# Patient Record
Sex: FEMALE | Race: OTHER | HISPANIC OR LATINO | Employment: FULL TIME | URBAN - METROPOLITAN AREA
[De-identification: names, ages, dates, MRNs, and addresses within clinical notes are randomized per-mention and may not be internally consistent; named-entity substitution may affect disease eponyms.]

---

## 2017-02-04 ENCOUNTER — HOSPITAL ENCOUNTER (EMERGENCY)
Facility: MEDICAL CENTER | Age: 27
End: 2017-02-04
Attending: EMERGENCY MEDICINE
Payer: COMMERCIAL

## 2017-02-04 ENCOUNTER — APPOINTMENT (OUTPATIENT)
Dept: RADIOLOGY | Facility: MEDICAL CENTER | Age: 27
End: 2017-02-04
Attending: EMERGENCY MEDICINE
Payer: COMMERCIAL

## 2017-02-04 VITALS
WEIGHT: 293 LBS | RESPIRATION RATE: 18 BRPM | HEIGHT: 67 IN | SYSTOLIC BLOOD PRESSURE: 103 MMHG | HEART RATE: 95 BPM | TEMPERATURE: 98 F | BODY MASS INDEX: 45.99 KG/M2 | DIASTOLIC BLOOD PRESSURE: 63 MMHG | OXYGEN SATURATION: 97 %

## 2017-02-04 DIAGNOSIS — R10.9 ABDOMINAL PAIN, UNSPECIFIED LOCATION: ICD-10-CM

## 2017-02-04 DIAGNOSIS — R19.7 DIARRHEA, UNSPECIFIED TYPE: ICD-10-CM

## 2017-02-04 DIAGNOSIS — R11.2 NAUSEA AND VOMITING, INTRACTABILITY OF VOMITING NOT SPECIFIED, UNSPECIFIED VOMITING TYPE: ICD-10-CM

## 2017-02-04 DIAGNOSIS — R10.30 LOWER ABDOMINAL PAIN: ICD-10-CM

## 2017-02-04 LAB
ALBUMIN SERPL BCP-MCNC: 4.7 G/DL (ref 3.2–4.9)
ALBUMIN/GLOB SERPL: 1.3 G/DL
ALP SERPL-CCNC: 107 U/L (ref 30–99)
ALT SERPL-CCNC: 20 U/L (ref 2–50)
ANION GAP SERPL CALC-SCNC: 10 MMOL/L (ref 0–11.9)
APPEARANCE UR: CLEAR
AST SERPL-CCNC: 18 U/L (ref 12–45)
BACTERIA GENITAL QL WET PREP: NORMAL
BASOPHILS # BLD AUTO: 0.4 % (ref 0–1.8)
BASOPHILS # BLD: 0.09 K/UL (ref 0–0.12)
BILIRUB SERPL-MCNC: 0.9 MG/DL (ref 0.1–1.5)
BUN SERPL-MCNC: 18 MG/DL (ref 8–22)
CALCIUM SERPL-MCNC: 9.5 MG/DL (ref 8.5–10.5)
CHLORIDE SERPL-SCNC: 107 MMOL/L (ref 96–112)
CO2 SERPL-SCNC: 18 MMOL/L (ref 20–33)
COLOR UR AUTO: YELLOW
CREAT SERPL-MCNC: 0.66 MG/DL (ref 0.5–1.4)
EOSINOPHIL # BLD AUTO: 0.12 K/UL (ref 0–0.51)
EOSINOPHIL NFR BLD: 0.6 % (ref 0–6.9)
ERYTHROCYTE [DISTWIDTH] IN BLOOD BY AUTOMATED COUNT: 43.8 FL (ref 35.9–50)
GFR SERPL CREATININE-BSD FRML MDRD: >60 ML/MIN/1.73 M 2
GLOBULIN SER CALC-MCNC: 3.7 G/DL (ref 1.9–3.5)
GLUCOSE SERPL-MCNC: 136 MG/DL (ref 65–99)
GLUCOSE UR QL STRIP.AUTO: NEGATIVE MG/DL
HCG UR QL: NEGATIVE
HCT VFR BLD AUTO: 44.3 % (ref 37–47)
HGB BLD-MCNC: 15.1 G/DL (ref 12–16)
IMM GRANULOCYTES # BLD AUTO: 0.1 K/UL (ref 0–0.11)
IMM GRANULOCYTES NFR BLD AUTO: 0.5 % (ref 0–0.9)
KETONES UR QL STRIP.AUTO: ABNORMAL MG/DL
LEUKOCYTE ESTERASE UR QL STRIP.AUTO: NEGATIVE
LIPASE SERPL-CCNC: 14 U/L (ref 11–82)
LYMPHOCYTES # BLD AUTO: 2.06 K/UL (ref 1–4.8)
LYMPHOCYTES NFR BLD: 10.1 % (ref 22–41)
MCH RBC QN AUTO: 29.3 PG (ref 27–33)
MCHC RBC AUTO-ENTMCNC: 34.1 G/DL (ref 33.6–35)
MCV RBC AUTO: 86 FL (ref 81.4–97.8)
MONOCYTES # BLD AUTO: 0.66 K/UL (ref 0–0.85)
MONOCYTES NFR BLD AUTO: 3.2 % (ref 0–13.4)
NEUTROPHILS # BLD AUTO: 17.46 K/UL (ref 2–7.15)
NEUTROPHILS NFR BLD: 85.2 % (ref 44–72)
NITRITE UR QL STRIP.AUTO: NEGATIVE
NRBC # BLD AUTO: 0 K/UL
NRBC BLD AUTO-RTO: 0 /100 WBC
PH UR STRIP.AUTO: 5.5 [PH]
PLATELET # BLD AUTO: 308 K/UL (ref 164–446)
PMV BLD AUTO: 11.1 FL (ref 9–12.9)
POTASSIUM SERPL-SCNC: 4 MMOL/L (ref 3.6–5.5)
PROT SERPL-MCNC: 8.4 G/DL (ref 6–8.2)
PROT UR QL STRIP: ABNORMAL MG/DL
RBC # BLD AUTO: 5.15 M/UL (ref 4.2–5.4)
RBC UR QL AUTO: ABNORMAL
SIGNIFICANT IND 70042: NORMAL
SITE SITE: NORMAL
SODIUM SERPL-SCNC: 135 MMOL/L (ref 135–145)
SOURCE SOURCE: NORMAL
SP GR UR: 1.02
WBC # BLD AUTO: 20.5 K/UL (ref 4.8–10.8)

## 2017-02-04 PROCEDURE — 99284 EMERGENCY DEPT VISIT MOD MDM: CPT

## 2017-02-04 PROCEDURE — 85025 COMPLETE CBC W/AUTO DIFF WBC: CPT

## 2017-02-04 PROCEDURE — 80053 COMPREHEN METABOLIC PANEL: CPT

## 2017-02-04 PROCEDURE — 700105 HCHG RX REV CODE 258: Performed by: EMERGENCY MEDICINE

## 2017-02-04 PROCEDURE — 81002 URINALYSIS NONAUTO W/O SCOPE: CPT

## 2017-02-04 PROCEDURE — 96374 THER/PROPH/DIAG INJ IV PUSH: CPT

## 2017-02-04 PROCEDURE — 36415 COLL VENOUS BLD VENIPUNCTURE: CPT

## 2017-02-04 PROCEDURE — 96375 TX/PRO/DX INJ NEW DRUG ADDON: CPT

## 2017-02-04 PROCEDURE — 81025 URINE PREGNANCY TEST: CPT

## 2017-02-04 PROCEDURE — 87491 CHLMYD TRACH DNA AMP PROBE: CPT

## 2017-02-04 PROCEDURE — 96361 HYDRATE IV INFUSION ADD-ON: CPT

## 2017-02-04 PROCEDURE — 74177 CT ABD & PELVIS W/CONTRAST: CPT

## 2017-02-04 PROCEDURE — 700111 HCHG RX REV CODE 636 W/ 250 OVERRIDE (IP): Performed by: EMERGENCY MEDICINE

## 2017-02-04 PROCEDURE — 87591 N.GONORRHOEAE DNA AMP PROB: CPT

## 2017-02-04 PROCEDURE — 83690 ASSAY OF LIPASE: CPT

## 2017-02-04 RX ORDER — MORPHINE SULFATE 4 MG/ML
4 INJECTION, SOLUTION INTRAMUSCULAR; INTRAVENOUS ONCE
Status: COMPLETED | OUTPATIENT
Start: 2017-02-04 | End: 2017-02-04

## 2017-02-04 RX ORDER — ONDANSETRON 2 MG/ML
4 INJECTION INTRAMUSCULAR; INTRAVENOUS ONCE
Status: COMPLETED | OUTPATIENT
Start: 2017-02-04 | End: 2017-02-04

## 2017-02-04 RX ORDER — ONDANSETRON 4 MG/1
4 TABLET, FILM COATED ORAL EVERY 8 HOURS PRN
Qty: 6 EACH | Refills: 2 | Status: SHIPPED | OUTPATIENT
Start: 2017-02-04 | End: 2019-02-08

## 2017-02-04 RX ORDER — SODIUM CHLORIDE 9 MG/ML
INJECTION, SOLUTION INTRAVENOUS CONTINUOUS
Status: DISCONTINUED | OUTPATIENT
Start: 2017-02-04 | End: 2017-02-04 | Stop reason: HOSPADM

## 2017-02-04 RX ADMIN — SODIUM CHLORIDE: 9 INJECTION, SOLUTION INTRAVENOUS at 09:15

## 2017-02-04 RX ADMIN — MORPHINE SULFATE 4 MG: 4 INJECTION INTRAVENOUS at 09:15

## 2017-02-04 RX ADMIN — ONDANSETRON 4 MG: 2 INJECTION, SOLUTION INTRAMUSCULAR; INTRAVENOUS at 09:15

## 2017-02-04 ASSESSMENT — PAIN SCALES - GENERAL
PAINLEVEL_OUTOF10: 5
PAINLEVEL_OUTOF10: 8
PAINLEVEL_OUTOF10: 5

## 2017-02-04 ASSESSMENT — LIFESTYLE VARIABLES: DO YOU DRINK ALCOHOL: NO

## 2017-02-04 NOTE — ED NOTES
"Chief Complaint   Patient presents with   • Vomiting     started today after eating \"Popeyes\" \" i think i got food poisoning\" \"smells are making me puke\"   • Abdominal Pain     denies pregnancy.        "

## 2017-02-04 NOTE — ED AVS SNAPSHOT
Night Node Software Access Code: [email or web address]  Expires: 3/6/2017  9:12 AM    Night Node Software  A secure, online tool to manage your health information     [employer]’s Night Node Software® is a secure, online tool that connects you to your personalized health information from the privacy of your home -- day or night - making it very easy for you to manage your healthcare. Once the activation process is completed, you can even access your medical information using the Night Node Software leslie, which is available for free in the [employer] Leslie store or Google Play store.     Night Node Software provides the following levels of access (as shown below):   My Chart Features   [facility] Primary Care Doctor [facility]  Specialists [facility]  Urgent  Care Non-[facility]  Primary Care  Doctor   Email your healthcare team securely and privately 24/7 X X X X   Manage appointments: schedule your next appointment; view details of past/upcoming appointments X      Request prescription refills. X      View recent personal medical records, including lab and immunizations X X X X   View health record, including health history, allergies, medications X X X X   Read reports about your outpatient visits, procedures, consult and ER notes X X X X   See your discharge summary, which is a recap of your hospital and/or ER visit that includes your diagnosis, lab results, and care plan. X X       How to register for Night Node Software:  1. Go to  [email or web address].  2. Click on the Sign Up Now box, which takes you to the New Member Sign Up page. You will need to provide the following information:  a. Enter your Night Node Software Access Code exactly as it appears at the top of this page. (You will not need to use this code after you’ve completed the sign-up process. If you do not sign up before the expiration date, you must request a new code.)   b. Enter your date of birth.   c. Enter your home email address.   d. Click Submit, and follow the next screen’s instructions.  3. Create a Night Node Software ID. This will be your Night Node Software  login ID and cannot be changed, so think of one that is secure and easy to remember.  4. Create a [employer] password. You can change your password at any time.  5. Enter your Password Reset Question and Answer. This can be used at a later time if you forget your password.   6. Enter your e-mail address. This allows you to receive e-mail notifications when new information is available in [employer].  7. Click Sign Up. You can now view your health information.    For assistance activating your [employer] account, call [phone]

## 2017-02-04 NOTE — DISCHARGE INSTRUCTIONS
Abdominal Pain, Women  Abdominal (stomach, pelvic, or belly) pain can be caused by many things. It is important to tell your doctor:  · The location of the pain.  · Does it come and go or is it present all the time?  · Are there things that start the pain (eating certain foods, exercise)?  · Are there other symptoms associated with the pain (fever, nausea, vomiting, diarrhea)?  All of this is helpful to know when trying to find the cause of the pain.  CAUSES   · Stomach: virus or bacteria infection, or ulcer.  · Intestine: appendicitis (inflamed appendix), regional ileitis (Crohn's disease), ulcerative colitis (inflamed colon), irritable bowel syndrome, diverticulitis (inflamed diverticulum of the colon), or cancer of the stomach or intestine.  · Gallbladder disease or stones in the gallbladder.  · Kidney disease, kidney stones, or infection.  · Pancreas infection or cancer.  · Fibromyalgia (pain disorder).  · Diseases of the female organs:  · Uterus: fibroid (non-cancerous) tumors or infection.  · Fallopian tubes: infection or tubal pregnancy.  · Ovary: cysts or tumors.  · Pelvic adhesions (scar tissue).  · Endometriosis (uterus lining tissue growing in the pelvis and on the pelvic organs).  · Pelvic congestion syndrome (female organs filling up with blood just before the menstrual period).  · Pain with the menstrual period.  · Pain with ovulation (producing an egg).  · Pain with an IUD (intrauterine device, birth control) in the uterus.  · Cancer of the female organs.  · Functional pain (pain not caused by a disease, may improve without treatment).  · Psychological pain.  · Depression.  DIAGNOSIS   Your doctor will decide the seriousness of your pain by doing an examination.  · Blood tests.  · X-rays.  · Ultrasound.  · CT scan (computed tomography, special type of X-ray).  · MRI (magnetic resonance imaging).  · Cultures, for infection.  · Barium enema (dye inserted in the large intestine, to better view it with  X-rays).  · Colonoscopy (looking in intestine with a lighted tube).  · Laparoscopy (minor surgery, looking in abdomen with a lighted tube).  · Major abdominal exploratory surgery (looking in abdomen with a large incision).  TREATMENT   The treatment will depend on the cause of the pain.   · Many cases can be observed and treated at home.  · Over-the-counter medicines recommended by your caregiver.  · Prescription medicine.  · Antibiotics, for infection.  · Birth control pills, for painful periods or for ovulation pain.  · Hormone treatment, for endometriosis.  · Nerve blocking injections.  · Physical therapy.  · Antidepressants.  · Counseling with a psychologist or psychiatrist.  · Minor or major surgery.  HOME CARE INSTRUCTIONS   · Do not take laxatives, unless directed by your caregiver.  · Take over-the-counter pain medicine only if ordered by your caregiver. Do not take aspirin because it can cause an upset stomach or bleeding.  · Try a clear liquid diet (broth or water) as ordered by your caregiver. Slowly move to a bland diet, as tolerated, if the pain is related to the stomach or intestine.  · Have a thermometer and take your temperature several times a day, and record it.  · Bed rest and sleep, if it helps the pain.  · Avoid sexual intercourse, if it causes pain.  · Avoid stressful situations.  · Keep your follow-up appointments and tests, as your caregiver orders.  · If the pain does not go away with medicine or surgery, you may try:  · Acupuncture.  · Relaxation exercises (yoga, meditation).  · Group therapy.  · Counseling.  SEEK MEDICAL CARE IF:   · You notice certain foods cause stomach pain.  · Your home care treatment is not helping your pain.  · You need stronger pain medicine.  · You want your IUD removed.  · You feel faint or lightheaded.  · You develop nausea and vomiting.  · You develop a rash.  · You are having side effects or an allergy to your medicine.  SEEK IMMEDIATE MEDICAL CARE IF:   · Your  pain does not go away or gets worse.  · You have a fever.  · Your pain is felt only in portions of the abdomen. The right side could possibly be appendicitis. The left lower portion of the abdomen could be colitis or diverticulitis.  · You are passing blood in your stools (bright red or black tarry stools, with or without vomiting).  · You have blood in your urine.  · You develop chills, with or without a fever.  · You pass out.  MAKE SURE YOU:   · Understand these instructions.  · Will watch your condition.  · Will get help right away if you are not doing well or get worse.  Document Released: 10/14/2008 Document Revised: 03/11/2013 Document Reviewed: 11/04/2010  [employer]Care® Patient Information ©2014 [employer].    Diarrhea  Diarrhea is watery poop (stool). It can make you feel weak, tired, thirsty, or give you a dry mouth (signs of dehydration). Watery poop is a sign of another problem, most often an infection. It often lasts 2-3 days. It can last longer if it is a sign of something serious. Take care of yourself as told by your doctor.  HOME CARE   · Drink 1 cup (8 ounces) of fluid each time you have watery poop.  · Do not drink the following fluids:  ¨ Those that contain simple sugars (fructose, glucose, galactose, lactose, sucrose, maltose).  ¨ Sports drinks.  ¨ Fruit juices.  ¨ Whole milk products.  ¨ Sodas.  ¨ Drinks with caffeine (coffee, tea, soda) or alcohol.  · Oral rehydration solution may be used if the doctor says it is okay. You may make your own solution. Follow this recipe:  ¨  - teaspoon table salt.  ¨ ¾ teaspoon baking soda.  ¨  teaspoon salt substitute containing potassium chloride.  ¨ 1 tablespoons sugar.  ¨ 1 liter (34 ounces) of water.  · Avoid the following foods:  ¨ High fiber foods, such as raw fruits and vegetables.  ¨ Nuts, seeds, and whole grain breads and cereals.  ¨  Those that are sweetened with sugar alcohols (xylitol, sorbitol, mannitol).  · Try eating the following  foods:  ¨ Starchy foods, such as rice, toast, pasta, low-sugar cereal, oatmeal, baked potatoes, crackers, and bagels.  ¨ Bananas.  ¨ Applesauce.  · Eat probiotic-rich foods, such as yogurt and milk products that are fermented.  · Wash your hands well after each time you have watery poop.  · Only take medicine as told by your doctor.  · Take a warm bath to help lessen burning or pain from having watery poop.  GET HELP RIGHT AWAY IF:   · You cannot drink fluids without throwing up (vomiting).  · You keep throwing up.  · You have blood in your poop, or your poop looks black and tarry.  · You do not pee (urinate) in 6-8 hours, or there is only a small amount of very dark pee.  · You have belly (abdominal) pain that gets worse or stays in the same spot (localizes).  · You are weak, dizzy, confused, or light-headed.  · You have a very bad headache.  · Your watery poop gets worse or does not get better.  · You have a fever or lasting symptoms for more than 2-3 days.  · You have a fever and your symptoms suddenly get worse.  MAKE SURE YOU:   · Understand these instructions.  · Will watch your condition.  · Will get help right away if you are not doing well or get worse.     This information is not intended to replace advice given to you by your health care provider. Make sure you discuss any questions you have with your health care provider.     Document Released: 06/05/2009 Document Revised: 01/08/2016 Document Reviewed: 08/25/2013  [employer] Patient Education ©2016 [employer]  Return if fever, vomiting or if no better in 12 hours.Return if fever, vomiting or if no better in 12 hours.

## 2017-02-04 NOTE — ED PROVIDER NOTES
"ED Provider Note    CHIEF COMPLAINT  Chief Complaint   Patient presents with   • Vomiting     started today after eating \"Popeyes\" \" i think i got food poisoning\" \"smells are making me puke\"   • Abdominal Pain     denies pregnancy.        HPI  Flaquita Mittal is a 26 y.o. female who presents with nausea vomiting, 2 AM, last vomiting here. Diarrhea 2 AM, last diarrhea here. No GI blood. Also complains of lower abdominal pain since around 6 AM this morning, moderate pain, gradual nonradiating,. Last period was 2 days ago, brief.. This is unusual for her Obstetric history is 0000. No fever no chills no dysuria urgency or frequency.     REVIEW OF SYSTEMS  See HPI for further details.Denies other G.I., G.U.. endrocine, cardiovascular, respriatory or neurological problems.  All other systems are negative.     PAST MEDICAL HISTORY  History reviewed. No pertinent past medical history.    FAMILY HISTORY  No family history on file.    SOCIAL HISTORY  Social History     Social History   • Marital Status: Single     Spouse Name: N/A   • Number of Children: N/A   • Years of Education: N/A     Social History Main Topics   • Smoking status: Current Every Day Smoker -- 0.10 packs/day     Types: Cigarettes   • Smokeless tobacco: None   • Alcohol Use: No   • Drug Use: No   • Sexual Activity: Not Asked     Other Topics Concern   • None     Social History Narrative       SURGICAL HISTORY  Past Surgical History   Procedure Laterality Date   • Other abdominal surgery       simone       CURRENT MEDICATIONS  Home Medications     Reviewed by Ty Blanton, STUDENT (Student Nurse) on 02/04/17 at 0721  Med List Status: Not Addressed    Medication Last Dose Status    promethazine (PHENERGAN) 25 MG Tab  Active                ALLERGIES  No Known Allergies    PHYSICAL EXAM  VITAL SIGNS: /71 mmHg  Pulse 100  Temp(Src) 36.8 °C (98.2 °F) (Temporal)  Resp 16  Ht 1.702 m (5' 7\")  Wt 133.9 kg (295 lb 3.1 oz)  BMI 46.22 kg/m2  SpO2 " 98%  LMP 01/03/2017 (Exact Date)  Constitutional: Well developed, Well nourished, moderately obese No acute distress, Non-toxic appearance.   HENT: Normocephalic, Atraumatic, Bilateral external ears normal, Oropharynx moist, No oral exudates, Nose normal.   Eyes: PERRL, EOMI, Conjunctiva normal, No discharge.   Neck: Normal range of motion, No tenderness, Supple, No stridor.   Lymphatic: No lymphadenopathy noted.   Cardiovascular: Normal heart rate, Normal rhythm, No murmurs, No rubs, No gallops.   Thorax & Lungs: Normal breath sounds, No respiratory distress, No wheezing, No chest tenderness.   Abdomen: Tender suprapubic area, no guarding no rigidity and the abdomen is soft.  No masses, No pulsatile masses.  Skin: Warm, Dry, No erythema, No rash.   Back: No tenderness, No CVA tenderness.   Extremities: Intact distal pulses, No edema, No tenderness, No cyanosis, No clubbing.   Musculoskeletal: Good range of motion in all major joints. No tenderness to palpation or major deformities noted.   Neurologic: Alert & oriented x 3, Normal motor function, Normal sensory function, No focal deficits noted.   Psychiatric: Affect normal, Judgment normal, Mood normal.   Pelvic exam, no masses no discharge no uterine enlargement and no tenderness no bleeding    RADIOLOGY/PROCEDURES  CT-ABDOMEN-PELVIS WITH   Final Result      1.  Prior cholecystectomy.  No gross biliary dilation.   2.  Normal appendix.   3.  5.8 cm LEFT ovarian cyst.          COURSE & MEDICAL DECISION MAKING  Pertinent Labs & Imaging studies reviewed. (See chart for details) electrolytes, unremarkable renal function and normal liver function normal white count is elevated 20,000. There is no shift 85 polys.  Urinalysis, normal, pregnancy test negative.      She has had vomiting, diarrhea, now lower abdominal pain. Tenderness suprapubic area. is given IV normal saline and morphine and Zofran.  She has been washed here in the emergency room for several hours. By  noon, her abdominal pain is gone. She no longer has any tenderness. CAT scan of the abdomen demonstrates no acute abnormalities. There is however a 5.8 cm left ovarian cyst. Currently she has no more vomiting or diarrhea. White count was noted to be 20,000, concerning however remainder of the workup has been negative. No evidence of urinary tract infection or acute abdominal process. This patient understands that abdominal pain may be very elusive. At this point there is no evidence of an acute abdominal emergency. However if the pain returns or is no better in 12 hours or any vomiting they should return to the emergency room immediately. Just because the lab and x-rays are normal does not rule out a severe abdominal emergency  We'll be discharging her, clear fluids, Zofran,  FINAL IMPRESSION  1.   1. Nausea and vomiting, intractability of vomiting not specified, unspecified vomiting type    2. Diarrhea, unspecified type    3. Abdominal pain, unspecified location    4. Lower abdominal pain        2.   3.     Disposition  Discharge instructions are understood. This patient is to return if fever vomiting or no better in 12 hours. Follow up with the [facility] clinic or private physician. Information sheets on vomiting diarrhea abdominal pain      Electronically signed by: Tam Washington, 2/4/2017 8:40 AM

## 2017-02-04 NOTE — ED AVS SNAPSHOT
After Visit Summary                                                                                                                Flaquita Mittal   MRN: [id number]    Department:  [facility], Emergency Dept   Date of Visit:  2/4/2017            [facility], Emergency Dept    [address]    [place] [address]    Phone:  [phone]      You were seen by     Tam Washington M.D.      Your Diagnosis Was     Nausea and vomiting, intractability of vomiting not specified, unspecified vomiting type     R11.2       These are the medications you received during your hospitalization from 02/04/2017 0614 to 02/04/2017 1210     Date/Time Order Dose Route Action    02/04/2017 0915 NS infusion   Intravenous New Bag    02/04/2017 0915 morphine (pf) 4 mg/ml injection 4 mg 4 mg Intravenous Given    02/04/2017 0915 ondansetron (ZOFRAN) syringe/vial injection 4 mg 4 mg Intravenous Given      Follow-up Information     1. Follow up with [facility]. Schedule an appointment as soon as possible for a visit today.    Contact information    [facility]  [place] [address]  [phone]        Medication Information     Review all of your home medications and newly ordered medications with your primary doctor and/or pharmacist as soon as possible. Follow medication instructions as directed by your doctor and/or pharmacist.     Please keep your complete medication list with you and share with your physician. Update the information when medications are discontinued, doses are changed, or new medications (including over-the-counter products) are added; and carry medication information at all times in the event of emergency situations.               Medication List      START taking these medications        Instructions    * ondansetron 4 MG Tabs tablet   Commonly known as:  ZOFRAN    Take 1 Tab by mouth every 8 hours as needed (FOR NAUSEA OR VIMITING) for up to 6 doses.   Dose:  4 mg       *  ondansetron 4 MG Tabs tablet   Commonly known as:  ZOFRAN    Take 1 Tab by mouth every 8 hours as needed (FOR NAUSEA OR VIMITING) for up to 6 doses.   Dose:  4 mg       * Notice:  This list has 2 medication(s) that are the same as other medications prescribed for you. Read the directions carefully, and ask your doctor or other care provider to review them with you.      ASK your doctor about these medications        Instructions    promethazine 25 MG Tabs   Commonly known as:  PHENERGAN    Take 1 Tab by mouth every 6 hours as needed for Nausea/Vomiting.   Dose:  25 mg               Procedures and tests performed during your visit     Procedure/Test Number of Times Performed    CBC WITH DIFFERENTIAL 1    CHLAMYDIA & GC BY PCR 1    COMP METABOLIC PANEL 1    CONSENT FOR CONTRAST INJECTION 2    CT-ABDOMEN-PELVIS WITH 1    ESTIMATED GFR 1    LIPASE 1    O2 Protocol 1    POC UA 1    POC URINE PREGNANCY 1    SALINE LOCK 1    Set Up for Pelvic Exam 1    WET PREP 1        Discharge Instructions       Abdominal Pain, Women  Abdominal (stomach, pelvic, or belly) pain can be caused by many things. It is important to tell your doctor:  · The location of the pain.  · Does it come and go or is it present all the time?  · Are there things that start the pain (eating certain foods, exercise)?  · Are there other symptoms associated with the pain (fever, nausea, vomiting, diarrhea)?  All of this is helpful to know when trying to find the cause of the pain.  CAUSES   · Stomach: virus or bacteria infection, or ulcer.  · Intestine: appendicitis (inflamed appendix), regional ileitis (Crohn's disease), ulcerative colitis (inflamed colon), irritable bowel syndrome, diverticulitis (inflamed diverticulum of the colon), or cancer of the stomach or intestine.  · Gallbladder disease or stones in the gallbladder.  · Kidney disease, kidney stones, or infection.  · Pancreas infection or cancer.  · Fibromyalgia (pain disorder).  · Diseases of the  female organs:  · Uterus: fibroid (non-cancerous) tumors or infection.  · Fallopian tubes: infection or tubal pregnancy.  · Ovary: cysts or tumors.  · Pelvic adhesions (scar tissue).  · Endometriosis (uterus lining tissue growing in the pelvis and on the pelvic organs).  · Pelvic congestion syndrome (female organs filling up with blood just before the menstrual period).  · Pain with the menstrual period.  · Pain with ovulation (producing an egg).  · Pain with an IUD (intrauterine device, birth control) in the uterus.  · Cancer of the female organs.  · Functional pain (pain not caused by a disease, may improve without treatment).  · Psychological pain.  · Depression.  DIAGNOSIS   Your doctor will decide the seriousness of your pain by doing an examination.  · Blood tests.  · X-rays.  · Ultrasound.  · CT scan (computed tomography, special type of X-ray).  · MRI (magnetic resonance imaging).  · Cultures, for infection.  · Barium enema (dye inserted in the large intestine, to better view it with X-rays).  · Colonoscopy (looking in intestine with a lighted tube).  · Laparoscopy (minor surgery, looking in abdomen with a lighted tube).  · Major abdominal exploratory surgery (looking in abdomen with a large incision).  TREATMENT   The treatment will depend on the cause of the pain.   · Many cases can be observed and treated at home.  · Over-the-counter medicines recommended by your caregiver.  · Prescription medicine.  · Antibiotics, for infection.  · Birth control pills, for painful periods or for ovulation pain.  · Hormone treatment, for endometriosis.  · Nerve blocking injections.  · Physical therapy.  · Antidepressants.  · Counseling with a psychologist or psychiatrist.  · Minor or major surgery.  HOME CARE INSTRUCTIONS   · Do not take laxatives, unless directed by your caregiver.  · Take over-the-counter pain medicine only if ordered by your caregiver. Do not take aspirin because it can cause an upset stomach or  bleeding.  · Try a clear liquid diet (broth or water) as ordered by your caregiver. Slowly move to a bland diet, as tolerated, if the pain is related to the stomach or intestine.  · Have a thermometer and take your temperature several times a day, and record it.  · Bed rest and sleep, if it helps the pain.  · Avoid sexual intercourse, if it causes pain.  · Avoid stressful situations.  · Keep your follow-up appointments and tests, as your caregiver orders.  · If the pain does not go away with medicine or surgery, you may try:  · Acupuncture.  · Relaxation exercises (yoga, meditation).  · Group therapy.  · Counseling.  SEEK MEDICAL CARE IF:   · You notice certain foods cause stomach pain.  · Your home care treatment is not helping your pain.  · You need stronger pain medicine.  · You want your IUD removed.  · You feel faint or lightheaded.  · You develop nausea and vomiting.  · You develop a rash.  · You are having side effects or an allergy to your medicine.  SEEK IMMEDIATE MEDICAL CARE IF:   · Your pain does not go away or gets worse.  · You have a fever.  · Your pain is felt only in portions of the abdomen. The right side could possibly be appendicitis. The left lower portion of the abdomen could be colitis or diverticulitis.  · You are passing blood in your stools (bright red or black tarry stools, with or without vomiting).  · You have blood in your urine.  · You develop chills, with or without a fever.  · You pass out.  MAKE SURE YOU:   · Understand these instructions.  · Will watch your condition.  · Will get help right away if you are not doing well or get worse.  Document Released: 10/14/2008 Document Revised: 03/11/2013 Document Reviewed: 11/04/2010  ExitCare® Patient Information ©2014 [employer].    Diarrhea  Diarrhea is watery poop (stool). It can make you feel weak, tired, thirsty, or give you a dry mouth (signs of dehydration). Watery poop is a sign of another problem, most often an infection. It  often lasts 2-3 days. It can last longer if it is a sign of something serious. Take care of yourself as told by your doctor.  HOME CARE   · Drink 1 cup (8 ounces) of fluid each time you have watery poop.  · Do not drink the following fluids:  ¨ Those that contain simple sugars (fructose, glucose, galactose, lactose, sucrose, maltose).  ¨ Sports drinks.  ¨ Fruit juices.  ¨ Whole milk products.  ¨ Sodas.  ¨ Drinks with caffeine (coffee, tea, soda) or alcohol.  · Oral rehydration solution may be used if the doctor says it is okay. You may make your own solution. Follow this recipe:  ¨  - teaspoon table salt.  ¨ ¾ teaspoon baking soda.  ¨  teaspoon salt substitute containing potassium chloride.  ¨ 1 tablespoons sugar.  ¨ 1 liter (34 ounces) of water.  · Avoid the following foods:  ¨ High fiber foods, such as raw fruits and vegetables.  ¨ Nuts, seeds, and whole grain breads and cereals.  ¨  Those that are sweetened with sugar alcohols (xylitol, sorbitol, mannitol).  · Try eating the following foods:  ¨ Starchy foods, such as rice, toast, pasta, low-sugar cereal, oatmeal, baked potatoes, crackers, and bagels.  ¨ Bananas.  ¨ Applesauce.  · Eat probiotic-rich foods, such as yogurt and milk products that are fermented.  · Wash your hands well after each time you have watery poop.  · Only take medicine as told by your doctor.  · Take a warm bath to help lessen burning or pain from having watery poop.  GET HELP RIGHT AWAY IF:   · You cannot drink fluids without throwing up (vomiting).  · You keep throwing up.  · You have blood in your poop, or your poop looks black and tarry.  · You do not pee (urinate) in 6-8 hours, or there is only a small amount of very dark pee.  · You have belly (abdominal) pain that gets worse or stays in the same spot (localizes).  · You are weak, dizzy, confused, or light-headed.  · You have a very bad headache.  · Your watery poop gets worse or does not get better.  · You have a fever or lasting  symptoms for more than 2-3 days.  · You have a fever and your symptoms suddenly get worse.  MAKE SURE YOU:   · Understand these instructions.  · Will watch your condition.  · Will get help right away if you are not doing well or get worse.     This information is not intended to replace advice given to you by your health care provider. Make sure you discuss any questions you have with your health care provider.     Document Released: 06/05/2009 Document Revised: 01/08/2016 Document Reviewed: 08/25/2013  [employer] Interactive Patient Education ©2016 [employer].  Return if fever, vomiting or if no better in 12 hours.Return if fever, vomiting or if no better in 12 hours.          Patient Information     Patient Information    Following emergency treatment: all patient requiring follow-up care must return either to a private physician or a clinic if your condition worsens before you are able to obtain further medical attention, please return to the emergency room.     Billing Information    At [facility], we work to make the billing process streamlined for our patients.  Our Representatives are here to answer any questions you may have regarding your hospital bill.  If you have insurance coverage and have supplied your insurance information to us, we will submit a claim to your insurer on your behalf.  Should you have any questions regarding your bill, we can be reached online or by phone as follows:  Online: You are able pay your bills online or live chat with our representatives about any billing questions you may have. We are here to help Monday - Friday from 8:00am to 7:30pm and 9:00am - 12:00pm on Saturdays.  Please visit [email or web address]  for more information.   Phone:  [phone] or [phone]    Please note that your emergency physician, surgeon, pathologist, radiologist, anesthesiologist, and other specialists are not employed by [facility] and will therefore bill  separately for their services.  Please contact them directly for any questions concerning their bills at the numbers below:     Emergency Physician Services:  [phone]  [facility]:  [phone]  [facility]:  [phone]  [facility]:  [phone]    1. Your final bill may vary from the amount quoted upon discharge if all procedures are not complete at that time, or if your doctor has additional procedures of which we are not aware. You will receive an additional bill if you return to the Emergency Department at [facility] for suture removal regardless of the facility of which the sutures were placed.     2. Please arrange for settlement of this account at the emergency registration.    3. All self-pay accounts are due in full at the time of treatment.  If you are unable to meet this obligation then payment is expected within 4-5 days.     4. If you have had radiology studies (CT, X-ray, Ultrasound, MRI), you have received a preliminary result during your emergency department visit. Please contact the radiology department [phone] to receive a copy of your final result. Please discuss the Final result with your primary physician or with the follow up physician provided.     Crisis Hotline:  [place] Crisis Hotline:  4-057-JBXSMYM or 1-409.295.2493  [place] Crisis Hotline:    1-116.808.8957 or 042-144-8422         ED Discharge Follow Up Questions    1. In order to provide you with very good care, we would like to follow up with a phone call in the next few days.  May we have your permission to contact you?     YES /  NO    2. What is the best phone number to call you? (       )_____-__________    3. What is the best time to call you?      Morning  /  Afternoon  /  Evening                   Patient Signature:  ____________________________________________________________    Date:  ____________________________________________________________

## 2017-02-04 NOTE — ED NOTES
Pelvic exam performed by ERP with female RN at bedside- pt tolerated procedure well- pt medicated as ordered

## 2017-02-04 NOTE — ED AVS SNAPSHOT
2/4/2017          Flaquita Mittal  [address] [place] [address]    Dear Flaquita:    [facility] wants to ensure your discharge home is safe and you or your loved ones have had all your questions answered regarding your care after you leave the hospital.    You may receive a telephone call within two days of your discharge.  This call is to make certain you understand your discharge instructions as well as ensure we provided you with the best care possible during your stay with us.     The call will only last approximately 3-5 minutes and will be done by a nurse.    Once again, we want to ensure your discharge home is safe and that you have a clear understanding of any next steps in your care.  If you have any questions or concerns, please do not hesitate to contact us, we are here for you.  Thank you for choosing [facility] for your healthcare needs.    Sincerely,    Shahbaz James    [facility]

## 2017-02-06 LAB
C TRACH DNA SPEC QL NAA+PROBE: NEGATIVE
N GONORRHOEA DNA SPEC QL NAA+PROBE: NEGATIVE
SPECIMEN SOURCE: NORMAL

## 2017-08-04 PROCEDURE — 99284 EMERGENCY DEPT VISIT MOD MDM: CPT

## 2017-08-04 ASSESSMENT — PAIN SCALES - GENERAL: PAINLEVEL_OUTOF10: 8

## 2017-08-05 ENCOUNTER — APPOINTMENT (OUTPATIENT)
Dept: RADIOLOGY | Facility: MEDICAL CENTER | Age: 27
End: 2017-08-05
Attending: EMERGENCY MEDICINE
Payer: COMMERCIAL

## 2017-08-05 ENCOUNTER — HOSPITAL ENCOUNTER (EMERGENCY)
Facility: MEDICAL CENTER | Age: 27
End: 2017-08-05
Attending: EMERGENCY MEDICINE
Payer: COMMERCIAL

## 2017-08-05 VITALS
OXYGEN SATURATION: 95 % | HEIGHT: 67 IN | SYSTOLIC BLOOD PRESSURE: 110 MMHG | DIASTOLIC BLOOD PRESSURE: 71 MMHG | RESPIRATION RATE: 17 BRPM | HEART RATE: 85 BPM | BODY MASS INDEX: 45.99 KG/M2 | WEIGHT: 293 LBS | TEMPERATURE: 98.1 F

## 2017-08-05 DIAGNOSIS — R10.31 RIGHT LOWER QUADRANT ABDOMINAL PAIN: ICD-10-CM

## 2017-08-05 DIAGNOSIS — N83.202 CYST OF LEFT OVARY: ICD-10-CM

## 2017-08-05 LAB
ALBUMIN SERPL BCP-MCNC: 3.8 G/DL (ref 3.2–4.9)
ALBUMIN/GLOB SERPL: 1.1 G/DL
ALP SERPL-CCNC: 99 U/L (ref 30–99)
ALT SERPL-CCNC: 19 U/L (ref 2–50)
ANION GAP SERPL CALC-SCNC: 8 MMOL/L (ref 0–11.9)
AST SERPL-CCNC: 15 U/L (ref 12–45)
BASOPHILS # BLD AUTO: 0.4 % (ref 0–1.8)
BASOPHILS # BLD: 0.05 K/UL (ref 0–0.12)
BILIRUB SERPL-MCNC: 0.5 MG/DL (ref 0.1–1.5)
BUN SERPL-MCNC: 13 MG/DL (ref 8–22)
CALCIUM SERPL-MCNC: 9.4 MG/DL (ref 8.5–10.5)
CHLORIDE SERPL-SCNC: 107 MMOL/L (ref 96–112)
CO2 SERPL-SCNC: 21 MMOL/L (ref 20–33)
CREAT SERPL-MCNC: 0.66 MG/DL (ref 0.5–1.4)
EOSINOPHIL # BLD AUTO: 0.29 K/UL (ref 0–0.51)
EOSINOPHIL NFR BLD: 2.3 % (ref 0–6.9)
ERYTHROCYTE [DISTWIDTH] IN BLOOD BY AUTOMATED COUNT: 44.2 FL (ref 35.9–50)
GFR SERPL CREATININE-BSD FRML MDRD: >60 ML/MIN/1.73 M 2
GLOBULIN SER CALC-MCNC: 3.6 G/DL (ref 1.9–3.5)
GLUCOSE SERPL-MCNC: 92 MG/DL (ref 65–99)
HCG SERPL QL: NEGATIVE
HCT VFR BLD AUTO: 42.2 % (ref 37–47)
HGB BLD-MCNC: 14 G/DL (ref 12–16)
IMM GRANULOCYTES # BLD AUTO: 0.06 K/UL (ref 0–0.11)
IMM GRANULOCYTES NFR BLD AUTO: 0.5 % (ref 0–0.9)
LIPASE SERPL-CCNC: 17 U/L (ref 11–82)
LYMPHOCYTES # BLD AUTO: 4.54 K/UL (ref 1–4.8)
LYMPHOCYTES NFR BLD: 35.7 % (ref 22–41)
MCH RBC QN AUTO: 28.3 PG (ref 27–33)
MCHC RBC AUTO-ENTMCNC: 33.2 G/DL (ref 33.6–35)
MCV RBC AUTO: 85.4 FL (ref 81.4–97.8)
MONOCYTES # BLD AUTO: 0.98 K/UL (ref 0–0.85)
MONOCYTES NFR BLD AUTO: 7.7 % (ref 0–13.4)
NEUTROPHILS # BLD AUTO: 6.78 K/UL (ref 2–7.15)
NEUTROPHILS NFR BLD: 53.4 % (ref 44–72)
NRBC # BLD AUTO: 0 K/UL
NRBC BLD AUTO-RTO: 0 /100 WBC
PLATELET # BLD AUTO: 313 K/UL (ref 164–446)
PMV BLD AUTO: 10.8 FL (ref 9–12.9)
POTASSIUM SERPL-SCNC: 3.8 MMOL/L (ref 3.6–5.5)
PROT SERPL-MCNC: 7.4 G/DL (ref 6–8.2)
RBC # BLD AUTO: 4.94 M/UL (ref 4.2–5.4)
SODIUM SERPL-SCNC: 136 MMOL/L (ref 135–145)
WBC # BLD AUTO: 12.7 K/UL (ref 4.8–10.8)

## 2017-08-05 PROCEDURE — 74177 CT ABD & PELVIS W/CONTRAST: CPT

## 2017-08-05 PROCEDURE — 96374 THER/PROPH/DIAG INJ IV PUSH: CPT

## 2017-08-05 PROCEDURE — 80053 COMPREHEN METABOLIC PANEL: CPT

## 2017-08-05 PROCEDURE — 700117 HCHG RX CONTRAST REV CODE 255: Performed by: EMERGENCY MEDICINE

## 2017-08-05 PROCEDURE — 700111 HCHG RX REV CODE 636 W/ 250 OVERRIDE (IP): Performed by: EMERGENCY MEDICINE

## 2017-08-05 PROCEDURE — 76830 TRANSVAGINAL US NON-OB: CPT

## 2017-08-05 PROCEDURE — 83690 ASSAY OF LIPASE: CPT

## 2017-08-05 PROCEDURE — 36415 COLL VENOUS BLD VENIPUNCTURE: CPT

## 2017-08-05 PROCEDURE — 85025 COMPLETE CBC W/AUTO DIFF WBC: CPT

## 2017-08-05 PROCEDURE — 84703 CHORIONIC GONADOTROPIN ASSAY: CPT

## 2017-08-05 RX ORDER — KETOROLAC TROMETHAMINE 30 MG/ML
30 INJECTION, SOLUTION INTRAMUSCULAR; INTRAVENOUS ONCE
Status: COMPLETED | OUTPATIENT
Start: 2017-08-05 | End: 2017-08-05

## 2017-08-05 RX ORDER — NITROFURANTOIN MACROCRYSTALS 100 MG/1
100 CAPSULE ORAL EVERY 6 HOURS
Qty: 28 CAP | Refills: 0 | Status: SHIPPED | OUTPATIENT
Start: 2017-08-05 | End: 2017-08-05

## 2017-08-05 RX ORDER — PHENAZOPYRIDINE HYDROCHLORIDE 200 MG/1
200 TABLET, FILM COATED ORAL 3 TIMES DAILY PRN
Qty: 6 TAB | Refills: 0 | Status: SHIPPED | OUTPATIENT
Start: 2017-08-05 | End: 2017-08-05

## 2017-08-05 RX ADMIN — KETOROLAC TROMETHAMINE 30 MG: 30 INJECTION, SOLUTION INTRAMUSCULAR at 03:33

## 2017-08-05 RX ADMIN — IOHEXOL 100 ML: 350 INJECTION, SOLUTION INTRAVENOUS at 07:42

## 2017-08-05 ASSESSMENT — ENCOUNTER SYMPTOMS
SHORTNESS OF BREATH: 0
HEADACHES: 0
ABDOMINAL PAIN: 1
FEVER: 0
NAUSEA: 1
BACK PAIN: 0

## 2017-08-05 ASSESSMENT — LIFESTYLE VARIABLES: DO YOU DRINK ALCOHOL: NO

## 2017-08-05 ASSESSMENT — PAIN SCALES - GENERAL: PAINLEVEL_OUTOF10: 0

## 2017-08-05 NOTE — ED AVS SNAPSHOT
SeeWhy Access Code: Activation code not generated  Current SeeWhy Status: Patient Declined    [employer]  A secure, online tool to manage your health information     [employer]’s SeeWhy® is a secure, online tool that connects you to your personalized health information from the privacy of your home -- day or night - making it very easy for you to manage your healthcare. Once the activation process is completed, you can even access your medical information using the SeeWhy leslie, which is available for free in the Apple Leslie store or Google Play store.     SeeWhy provides the following levels of access (as shown below):   My Chart Features   [facility] Primary Care Doctor [facility]  Specialists [facility]  Urgent  Care Non-[facility]  Primary Care  Doctor   Email your healthcare team securely and privately 24/7 X X X X   Manage appointments: schedule your next appointment; view details of past/upcoming appointments X      Request prescription refills. X      View recent personal medical records, including lab and immunizations X X X X   View health record, including health history, allergies, medications X X X X   Read reports about your outpatient visits, procedures, consult and ER notes X X X X   See your discharge summary, which is a recap of your hospital and/or ER visit that includes your diagnosis, lab results, and care plan. X X       How to register for SeeWhy:  1. Go to  [email or web address].  2. Click on the Sign Up Now box, which takes you to the New Member Sign Up page. You will need to provide the following information:  a. Enter your SeeWhy Access Code exactly as it appears at the top of this page. (You will not need to use this code after you’ve completed the sign-up process. If you do not sign up before the expiration date, you must request a new code.)   b. Enter your date of birth.   c. Enter your home email address.   d. Click Submit, and follow the next screen’s instructions.  3. Create a SeeWhy ID.  This will be your [facility] login ID and cannot be changed, so think of one that is secure and easy to remember.  4. Create a [employer] password. You can change your password at any time.  5. Enter your Password Reset Question and Answer. This can be used at a later time if you forget your password.   6. Enter your e-mail address. This allows you to receive e-mail notifications when new information is available in [facility] [facility].  7. Click Sign Up. You can now view your health information.    For assistance activating your [employer] account, call [phone]

## 2017-08-05 NOTE — ED AVS SNAPSHOT
8/5/2017    Flaquita Mittal  [address] [place] [address]    Dear Flaquita:    [facility] wants to ensure your discharge home is safe and you or your loved ones have had all of your questions answered regarding your care after you leave the hospital.    Below is a list of resources and contact information should you have any questions regarding your hospital stay, follow-up instructions, or active medical symptoms.    Questions or Concerns Regarding… Contact   Medical Questions Related to Your Discharge  (7 days a week, 8am-5pm) Contact a Nurse Care Coordinator   [phone]   Medical Questions Not Related to Your Discharge  (24 hours a day / 7 days a week)  Contact the Nurse Health Line   [phone]    Medications or Discharge Instructions Refer to your discharge packet   or contact your [facility] Primary Care Provider   [phone]   Follow-up Appointment(s) Schedule your appointment via [employer]   or contact Scheduling [phone]   Billing Review your statement via [employer]  or contact Billing [phone]   Medical Records Review your records via [employer]   or contact Medical Records [phone]     You may receive a telephone call within two days of discharge. This call is to make certain you understand your discharge instructions and have the opportunity to have any questions answered. You can also easily access your medical information, test results and upcoming appointments via the [employer] free online health management tool. You can learn more and sign up at [employer]. For assistance setting up your [employer] account, please call [phone].    Once again, we want to ensure your discharge home is safe and that you have a clear understanding of any next steps in your care. If you have any questions or concerns, please do not hesitate to contact us, we are here for you. Thank you for choosing [facility] for your healthcare needs.    Sincerely,    Your [facility] Healthcare Team

## 2017-08-05 NOTE — ED PROVIDER NOTES
ED Provider Note    Scribed for Greg Brandon M.D. by Antonia Bernal. 8/5/2017, 3:04 AM.    Primary care provider: Pcp Pt States None  Means of arrival: Walk-in  History obtained from: Patient   History limited by: None    CHIEF COMPLAINT  Chief Complaint   Patient presents with   • Ovarian Cyst     8 cm ovarian cyst.  Not having periods for over three months. Occasional r sided cramping, sharp.         HPI  Flaquita Mittal is a 27 y.o. female who presents to the Emergency Department for an 8cm cyst on her left side. Patient was referred by all of the OB/GYN who all referred her to the ED for immediate operation of her cyst secondary to the size of the cyst. She is currently having intermittent sharp abdominal cramping on her right side that suddenly worsened last week and nausea associated. She denies any shortness of breath, vomiting, chest pain, or fever associated. Patient has not had her period in three months and is not currently sexually active. Patient has history of a cholecystectomy. She does not currently have an OB/GYN provider.     REVIEW OF SYSTEMS  Review of Systems   Constitutional: Negative for fever.   Respiratory: Negative for shortness of breath.    Cardiovascular: Negative for chest pain.   Gastrointestinal: Positive for nausea and abdominal pain (abdominal cramping ).   Musculoskeletal: Negative for back pain.   Neurological: Negative for headaches.   All other systems reviewed and are negative.      PAST MEDICAL HISTORY   No past medical history noted.     SURGICAL HISTORY   has past surgical history that includes other abdominal surgery.    SOCIAL HISTORY  Social History   Substance Use Topics   • Smoking status: Current Every Day Smoker -- 0.10 packs/day     Types: Cigarettes   • Smokeless tobacco: None   • Alcohol Use: No      History   Drug Use No       FAMILY HISTORY  History reviewed. No pertinent family history.    CURRENT MEDICATIONS  No current facility-administered  "medications on file prior to encounter.     Current Outpatient Prescriptions on File Prior to Encounter   Medication Sig Dispense Refill   • ondansetron (ZOFRAN) 4 MG Tab tablet Take 1 Tab by mouth every 8 hours as needed (FOR NAUSEA OR VIMITING) for up to 6 doses. 6 Each 2   • ondansetron (ZOFRAN) 4 MG Tab tablet Take 1 Tab by mouth every 8 hours as needed (FOR NAUSEA OR VIMITING) for up to 6 doses. 6 Each 2   • promethazine (PHENERGAN) 25 MG Tab Take 1 Tab by mouth every 6 hours as needed for Nausea/Vomiting. 15 Tab 0       ALLERGIES  No Known Allergies    PHYSICAL EXAM  VITAL SIGNS: /75 mmHg  Pulse 94  Temp(Src) 37 °C (98.6 °F) (Temporal)  Resp 16  Ht 1.702 m (5' 7\")  Wt 151.2 kg (333 lb 5.4 oz)  BMI 52.20 kg/m2  SpO2 93%    Constitutional: Well developed, Well nourished, mild distress.   HENT: Normocephalic, Atraumatic, Oropharynx moist.   Eyes: Conjunctiva normal, No discharge.   Neck: Supple, No stridor  Cardiovascular: Normal heart rate, Normal rhythm, No murmurs, equal pulses.   Pulmonary: Normal breath sounds, No respiratory distress, No wheezing, No rales, No rhonchi.  Chest: No chest wall tenderness or deformity.   Abdomen:Soft,Tenderness to right lower quadrant, No masses, no rebound, no guarding.   Back: No CVA tenderness.   Musculoskeletal: No major deformities noted, No tenderness.   Skin: Warm, Dry, No erythema, No rash.   Neurologic: Alert & oriented x 3, Normal motor function,  No focal deficits noted.   Psychiatric: Affect normal, Judgment normal, Mood normal.     LABS  Results for orders placed or performed during the hospital encounter of 08/05/17   CBC WITH DIFFERENTIAL   Result Value Ref Range    WBC 12.7 (H) 4.8 - 10.8 K/uL    RBC 4.94 4.20 - 5.40 M/uL    Hemoglobin 14.0 12.0 - 16.0 g/dL    Hematocrit 42.2 37.0 - 47.0 %    MCV 85.4 81.4 - 97.8 fL    MCH 28.3 27.0 - 33.0 pg    MCHC 33.2 (L) 33.6 - 35.0 g/dL    RDW 44.2 35.9 - 50.0 fL    Platelet Count 313 164 - 446 K/uL    MPV " 10.8 9.0 - 12.9 fL    Neutrophils-Polys 53.40 44.00 - 72.00 %    Lymphocytes 35.70 22.00 - 41.00 %    Monocytes 7.70 0.00 - 13.40 %    Eosinophils 2.30 0.00 - 6.90 %    Basophils 0.40 0.00 - 1.80 %    Immature Granulocytes 0.50 0.00 - 0.90 %    Nucleated RBC 0.00 /100 WBC    Neutrophils (Absolute) 6.78 2.00 - 7.15 K/uL    Lymphs (Absolute) 4.54 1.00 - 4.80 K/uL    Monos (Absolute) 0.98 (H) 0.00 - 0.85 K/uL    Eos (Absolute) 0.29 0.00 - 0.51 K/uL    Baso (Absolute) 0.05 0.00 - 0.12 K/uL    Immature Granulocytes (abs) 0.06 0.00 - 0.11 K/uL    NRBC (Absolute) 0.00 K/uL   COMP METABOLIC PANEL   Result Value Ref Range    Sodium 136 135 - 145 mmol/L    Potassium 3.8 3.6 - 5.5 mmol/L    Chloride 107 96 - 112 mmol/L    Co2 21 20 - 33 mmol/L    Anion Gap 8.0 0.0 - 11.9    Glucose 92 65 - 99 mg/dL    Bun 13 8 - 22 mg/dL    Creatinine 0.66 0.50 - 1.40 mg/dL    Calcium 9.4 8.5 - 10.5 mg/dL    AST(SGOT) 15 12 - 45 U/L    ALT(SGPT) 19 2 - 50 U/L    Alkaline Phosphatase 99 30 - 99 U/L    Total Bilirubin 0.5 0.1 - 1.5 mg/dL    Albumin 3.8 3.2 - 4.9 g/dL    Total Protein 7.4 6.0 - 8.2 g/dL    Globulin 3.6 (H) 1.9 - 3.5 g/dL    A-G Ratio 1.1 g/dL   LIPASE   Result Value Ref Range    Lipase 17 11 - 82 U/L   ESTIMATED GFR   Result Value Ref Range    GFR If African American >60 >60 mL/min/1.73 m 2    GFR If Non African American >60 >60 mL/min/1.73 m 2   HCG QUAL SERUM   Result Value Ref Range    Beta-Hcg Qualitative Serum Negative Negative       All labs reviewed by me.    RADIOLOGY  US-GYN-PELVIS TRANSVAGINAL   Final Result      1.  Nabothian cysts in the cervix. Normal appearance of the uterus otherwise.   2.  Large simple cystic structure in the left pelvis, likely ovarian in origin. No normal ovarian tissue is noted on either side.      CT-ABDOMEN-PELVIS WITH    (Results Pending)     The radiologist's interpretation of all radiological studies have been reviewed by me.    COURSE & MEDICAL DECISION MAKING  Pertinent Labs & Imaging  studies reviewed. (See chart for details)    3:04 AM - Patient seen and examined at bedside. Patient will be treated with 30mg Toradol. Ordered US-GYN pelvis transvaginal, CT-abdomen-pelvis, estimated GFR, CBC with differential, CMP, Lipase to evaluate her symptoms. The differential diagnoses include but are not limited to: appendicitis, ovarian torsion, ovarian cyst.      Reexamined at 5 AM she is still painful in the right lower quadrant. Discussed ultrasound findings. At this point in time CT of the abdomen and pelvis is ordered to rule out acute appendicitis given the fact the patient's elevation in white blood cell count. Patient is turned over at 6 AM pending CT.    Medical Decision Making: She presents with right lower quadrant pain at this point, do not have clear etiology. CT is pending at this point time. Ultrasound was done to rule out ovarian torsion this is not showing signs of torsion and the patient does have a simple ovarian cyst. At this point time there is no signs of ovarian torsion. CT is still pending. I suspect if this is negative. She will be discharged home          DISPOSITION:  Patient will be discharged home in stable condition.    FOLLOW UP:  No follow-up provider specified.    OUTPATIENT MEDICATIONS:  New Prescriptions    No medications on file            FINAL IMPRESSION  1. Cyst of left ovary    2. Right lower quadrant abdominal pain          Antonia LADD (Immanuel), am scribing for, and in the presence of, Greg Brandon M.D.    Electronically signed by: Antonia Bernal (Immanuel), 8/5/2017    Greg LADD M.D. personally performed the services described in this documentation, as scribed by Antonia Bernal in my presence, and it is both accurate and complete.    The note accurately reflects work and decisions made by me.  Greg Brandon  8/5/2017  7:19 AM

## 2017-08-05 NOTE — ED AVS SNAPSHOT
Home Care Instructions                                                                                                                Flaquita Mittal   MRN: [id number]    Department:  [facility], Emergency Dept   Date of Visit:  8/4/2017            [facility], Emergency Dept    [address]    [place] [address]    Phone:  [phone]      You were seen by     1. Greg Brandon M.D.    2. Duc Quick M.D.      Your Diagnosis Was     Cyst of left ovary     N83.202       These are the medications you received during your hospitalization from 08/04/2017 2305 to 08/05/2017 0810     Date/Time Order Dose Route Action    08/05/2017 0333 ketorolac (TORADOL) injection 30 mg 30 mg Intravenous Given    08/05/2017 0742 iohexol (OMNIPAQUE) 350 mg/mL 100 mL Intravenous Given      Follow-up Information     1. Follow up with Nadege Jaimes M.D.. Schedule an appointment as soon as possible for a visit in 1 week.    Specialty:  OB/Gyn    Contact information    [address]  [facility] [address] [phone]          2. Follow up with Ruby Ryan M.D.. Schedule an appointment as soon as possible for a visit in 2 weeks.    Specialty:  OB/Gyn    Contact information    [address]  [facility] [address] [phone]        Medication Information     Review all of your home medications and newly ordered medications with your primary doctor and/or pharmacist as soon as possible. Follow medication instructions as directed by your doctor and/or pharmacist.     Please keep your complete medication list with you and share with your physician. Update the information when medications are discontinued, doses are changed, or new medications (including over-the-counter products) are added; and carry medication information at all times in the event of emergency situations.               Medication List      ASK your doctor about these medications        Instructions    Morning Afternoon Evening Bedtime    * ondansetron 4 MG Tabs tablet   Commonly known as:  ZOFRAN        Take 1 Tab by mouth every 8 hours as needed (FOR NAUSEA OR VIMITING) for up to 6 doses.   Dose:  4 mg                        * ondansetron 4 MG Tabs tablet   Commonly known as:  ZOFRAN        Take 1 Tab by mouth every 8 hours as needed (FOR NAUSEA OR VIMITING) for up to 6 doses.   Dose:  4 mg                        promethazine 25 MG Tabs   Commonly known as:  PHENERGAN        Take 1 Tab by mouth every 6 hours as needed for Nausea/Vomiting.   Dose:  25 mg                        * Notice:  This list has 2 medication(s) that are the same as other medications prescribed for you. Read the directions carefully, and ask your doctor or other care provider to review them with you.            Procedures and tests performed during your visit     Procedure/Test Number of Times Performed    CBC WITH DIFFERENTIAL 1    COMP METABOLIC PANEL 1    CONSENT FOR CONTRAST INJECTION 2    CT-ABDOMEN-PELVIS WITH 1    ESTIMATED GFR 1    HCG QUAL SERUM 1    IV Saline Lock 1    LIPASE 1    US-GYN-PELVIS TRANSVAGINAL 1        Discharge Instructions       Return if you have increasing abdominal pain, fever, severe vomiting, heavy vaginal bleeding greater than a pad an hour   Abdominal Pain, Possible Early Appendicitis  Abdominal (belly) pain can be caused by many things. Your caregiver decides the seriousness of your pain by an exam and possibly blood tests and X-rays. Many cases can be observed and treated at home. Most abdominal pain in children is functional. This means it is not caused by a disease. It will probably improve without treatment.  At this time, your caregiver feels that the abdominal pain could possibly be caused by early appendicitis. This means that you will require follow-up. You may be allowed to go home but may need to return for re-examination and repeat lab work.   HOME CARE INSTRUCTIONS   · Do not take  "or give laxatives unless directed by your caregiver.   · Take pain medication only if ordered by your caregiver.   · Take no food or water by mouth unless instructed to do so by your caregiver.   SEEK IMMEDIATE MEDICAL CARE IF:   · The pain does not go away or becomes much worse.   · An oral temperature above 102° F (38.9° C) develops.   · Repeated vomiting occurs.   · Blood is being passed in stools (bright red or black tarry stools).   · You develop blood in the urine or cannot pass your urine.   · You develop severe pain in other parts of your body.   MAKE SURE YOU:   · Understand these instructions.   · Will watch your condition.   · Will get help right away if you are not doing well or get worse.   Document Released: 01/06/2009 Document Revised: 03/11/2013 Document Reviewed: 01/06/2009  [employer]® Patient Information ©2013 [employer].      Ovarian Cyst  An ovarian cyst is a fluid-filled sac that forms on an ovary. The ovaries are small organs that produce eggs in women. Various types of cysts can form on the ovaries. Most are not cancerous. Many do not cause problems, and they often go away on their own. Some may cause symptoms and require treatment. Common types of ovarian cysts include:  · Functional cysts--These cysts may occur every month during the menstrual cycle. This is normal. The cysts usually go away with the next menstrual cycle if the woman does not get pregnant. Usually, there are no symptoms with a functional cyst.  · Endometrioma cysts--These cysts form from the tissue that lines the uterus. They are also called \"chocolate cysts\" because they become filled with blood that turns brown. This type of cyst can cause pain in the lower abdomen during intercourse and with your menstrual period.  · Cystadenoma cysts--This type develops from the cells on the outside of the ovary. These cysts can get very big and cause lower abdomen pain and pain with intercourse. This type of cyst can twist on itself, " cut off its blood supply, and cause severe pain. It can also easily rupture and cause a lot of pain.  · Dermoid cysts--This type of cyst is sometimes found in both ovaries. These cysts may contain different kinds of body tissue, such as skin, teeth, hair, or cartilage. They usually do not cause symptoms unless they get very big.  · Theca lutein cysts--These cysts occur when too much of a certain hormone (human chorionic gonadotropin) is produced and overstimulates the ovaries to produce an egg. This is most common after procedures used to assist with the conception of a baby (in vitro fertilization).  CAUSES   · Fertility drugs can cause a condition in which multiple large cysts are formed on the ovaries. This is called ovarian hyperstimulation syndrome.  · A condition called polycystic ovary syndrome can cause hormonal imbalances that can lead to nonfunctional ovarian cysts.  SIGNS AND SYMPTOMS   Many ovarian cysts do not cause symptoms. If symptoms are present, they may include:  · Pelvic pain or pressure.  · Pain in the lower abdomen.  · Pain during sexual intercourse.  · Increasing girth (swelling) of the abdomen.  · Abnormal menstrual periods.  · Increasing pain with menstrual periods.  · Stopping having menstrual periods without being pregnant.  DIAGNOSIS   These cysts are commonly found during a routine or annual pelvic exam. Tests may be ordered to find out more about the cyst. These tests may include:  · Ultrasound.  · X-ray of the pelvis.  · CT scan.  · MRI.  · Blood tests.  TREATMENT   Many ovarian cysts go away on their own without treatment. Your health care provider may want to check your cyst regularly for 2-3 months to see if it changes. For women in menopause, it is particularly important to monitor a cyst closely because of the higher rate of ovarian cancer in menopausal women. When treatment is needed, it may include any of the following:  · A procedure to drain the cyst (aspiration). This may be  done using a long needle and ultrasound. It can also be done through a laparoscopic procedure. This involves using a thin, lighted tube with a tiny camera on the end (laparoscope) inserted through a small incision.  · Surgery to remove the whole cyst. This may be done using laparoscopic surgery or an open surgery involving a larger incision in the lower abdomen.  · Hormone treatment or birth control pills. These methods are sometimes used to help dissolve a cyst.  HOME CARE INSTRUCTIONS   · Only take over-the-counter or prescription medicines as directed by your health care provider.  · Follow up with your health care provider as directed.  · Get regular pelvic exams and Pap tests.  SEEK MEDICAL CARE IF:   · Your periods are late, irregular, or painful, or they stop.  · Your pelvic pain or abdominal pain does not go away.  · Your abdomen becomes larger or swollen.  · You have pressure on your bladder or trouble emptying your bladder completely.  · You have pain during sexual intercourse.  · You have feelings of fullness, pressure, or discomfort in your stomach.  · You lose weight for no apparent reason.  · You feel generally ill.  · You become constipated.  · You lose your appetite.  · You develop acne.  · You have an increase in body and facial hair.  · You are gaining weight, without changing your exercise and eating habits.  · You think you are pregnant.  SEEK IMMEDIATE MEDICAL CARE IF:   · You have increasing abdominal pain.  · You feel sick to your stomach (nauseous), and you throw up (vomit).  · You develop a fever that comes on suddenly.  · You have abdominal pain during a bowel movement.  · Your menstrual periods become heavier than usual.  MAKE SURE YOU:  · Understand these instructions.  · Will watch your condition.  · Will get help right away if you are not doing well or get worse.     This information is not intended to replace advice given to you by your health care provider. Make sure you discuss any  questions you have with your health care provider.     Document Released: 12/18/2006 Document Revised: 12/23/2014 Document Reviewed: 08/25/2014  Elsevier Interactive Patient Education ©2016 [employer]            Patient Information     Patient Information    Following emergency treatment: all patient requiring follow-up care must return either to a private physician or a clinic if your condition worsens before you are able to obtain further medical attention, please return to the emergency room.     Billing Information    At [facility], we work to make the billing process streamlined for our patients.  Our Representatives are here to answer any questions you may have regarding your hospital bill.  If you have insurance coverage and have supplied your insurance information to us, we will submit a claim to your insurer on your behalf.  Should you have any questions regarding your bill, we can be reached online or by phone as follows:  Online: You are able pay your bills online or live chat with our representatives about any billing questions you may have. We are here to help Monday - Friday from 8:00am to 7:30pm and 9:00am - 12:00pm on Saturdays.  Please visit [email or web address]  for more information.   Phone:  [phone] or [phone]    Please note that your emergency physician, surgeon, pathologist, radiologist, anesthesiologist, and other specialists are not employed by [facility] and will therefore bill separately for their services.  Please contact them directly for any questions concerning their bills at the numbers below:     Emergency Physician Services:  [phone]  [facility]:  [phone]  [facility]:  [phone]  [facility]:  [phone]    1. Your final bill may vary from the amount quoted upon discharge if all procedures are not complete at that time, or if your doctor has additional procedures of which  we are not aware. You will receive an additional bill if you return to the Emergency Department at [facility] for suture removal regardless of the facility of which the sutures were placed.     2. Please arrange for settlement of this account at the emergency registration.    3. All self-pay accounts are due in full at the time of treatment.  If you are unable to meet this obligation then payment is expected within 4-5 days.     4. If you have had radiology studies (CT, X-ray, Ultrasound, MRI), you have received a preliminary result during your emergency department visit. Please contact the radiology department [phone] to receive a copy of your final result. Please discuss the Final result with your primary physician or with the follow up physician provided.     Crisis Hotline:  [place] Crisis Hotline:  2-926-ZVJJDCN or 1-852.698.4303  [place] Crisis Hotline:    1-190.898.1863 or 822-389-0849         ED Discharge Follow Up Questions    1. In order to provide you with very good care, we would like to follow up with a phone call in the next few days.  May we have your permission to contact you?     YES /  NO    2. What is the best phone number to call you? (       )_____-__________    3. What is the best time to call you?      Morning  /  Afternoon  /  Evening                   Patient Signature:  ____________________________________________________________    Date:  ____________________________________________________________

## 2017-08-05 NOTE — ED NOTES
..Pt verbalizes understanding of dc instructions provided. Pt states that all questions have been answered and they feel comfortable with discharge instructions provided. Pt has dc paperwork in hand. Pt has all belongings in position. Pt to lobby w/o incident.

## 2017-08-05 NOTE — ED PROVIDER NOTES
ED Provider Note    Addendum:    CT-ABDOMEN-PELVIS WITH   Final Result      1.  No acute abdominal or pelvic findings. Normal appendix.   2.  9 cm cystic structure in the left pelvis, likely an ovarian cyst. Its characteristics similar to what is somewhat larger than on the prior scan.      US-GYN-PELVIS TRANSVAGINAL   Final Result      1.  Nabothian cysts in the cervix. Normal appearance of the uterus otherwise.   2.  Large simple cystic structure in the left pelvis, likely ovarian in origin. No normal ovarian tissue is noted on either side.        CT results reviewed as above. Discussed patient need to follow-up with Dr. New GYN for persistent left ovarian cyst.    Discharge diagnoses and plan as outlined by Dr. Brandon.    Electronically signed by: Duc Quick, 8/5/2017 1:40 PM

## 2017-08-05 NOTE — DISCHARGE INSTRUCTIONS
Return if you have increasing abdominal pain, fever, severe vomiting, heavy vaginal bleeding greater than a pad an hour   Abdominal Pain, Possible Early Appendicitis  Abdominal (belly) pain can be caused by many things. Your caregiver decides the seriousness of your pain by an exam and possibly blood tests and X-rays. Many cases can be observed and treated at home. Most abdominal pain in children is functional. This means it is not caused by a disease. It will probably improve without treatment.  At this time, your caregiver feels that the abdominal pain could possibly be caused by early appendicitis. This means that you will require follow-up. You may be allowed to go home but may need to return for re-examination and repeat lab work.   HOME CARE INSTRUCTIONS   · Do not take or give laxatives unless directed by your caregiver.   · Take pain medication only if ordered by your caregiver.   · Take no food or water by mouth unless instructed to do so by your caregiver.   SEEK IMMEDIATE MEDICAL CARE IF:   · The pain does not go away or becomes much worse.   · An oral temperature above 102° F (38.9° C) develops.   · Repeated vomiting occurs.   · Blood is being passed in stools (bright red or black tarry stools).   · You develop blood in the urine or cannot pass your urine.   · You develop severe pain in other parts of your body.   MAKE SURE YOU:   · Understand these instructions.   · Will watch your condition.   · Will get help right away if you are not doing well or get worse.   Document Released: 01/06/2009 Document Revised: 03/11/2013 Document Reviewed: 01/06/2009  ExitCare® Patient Information ©2013 [employer].      Ovarian Cyst  An ovarian cyst is a fluid-filled sac that forms on an ovary. The ovaries are small organs that produce eggs in women. Various types of cysts can form on the ovaries. Most are not cancerous. Many do not cause problems, and they often go away on their own. Some may cause symptoms and  "require treatment. Common types of ovarian cysts include:  · Functional cysts--These cysts may occur every month during the menstrual cycle. This is normal. The cysts usually go away with the next menstrual cycle if the woman does not get pregnant. Usually, there are no symptoms with a functional cyst.  · Endometrioma cysts--These cysts form from the tissue that lines the uterus. They are also called \"chocolate cysts\" because they become filled with blood that turns brown. This type of cyst can cause pain in the lower abdomen during intercourse and with your menstrual period.  · Cystadenoma cysts--This type develops from the cells on the outside of the ovary. These cysts can get very big and cause lower abdomen pain and pain with intercourse. This type of cyst can twist on itself, cut off its blood supply, and cause severe pain. It can also easily rupture and cause a lot of pain.  · Dermoid cysts--This type of cyst is sometimes found in both ovaries. These cysts may contain different kinds of body tissue, such as skin, teeth, hair, or cartilage. They usually do not cause symptoms unless they get very big.  · Theca lutein cysts--These cysts occur when too much of a certain hormone (human chorionic gonadotropin) is produced and overstimulates the ovaries to produce an egg. This is most common after procedures used to assist with the conception of a baby (in vitro fertilization).  CAUSES   · Fertility drugs can cause a condition in which multiple large cysts are formed on the ovaries. This is called ovarian hyperstimulation syndrome.  · A condition called polycystic ovary syndrome can cause hormonal imbalances that can lead to nonfunctional ovarian cysts.  SIGNS AND SYMPTOMS   Many ovarian cysts do not cause symptoms. If symptoms are present, they may include:  · Pelvic pain or pressure.  · Pain in the lower abdomen.  · Pain during sexual intercourse.  · Increasing girth (swelling) of the abdomen.  · Abnormal menstrual " periods.  · Increasing pain with menstrual periods.  · Stopping having menstrual periods without being pregnant.  DIAGNOSIS   These cysts are commonly found during a routine or annual pelvic exam. Tests may be ordered to find out more about the cyst. These tests may include:  · Ultrasound.  · X-ray of the pelvis.  · CT scan.  · MRI.  · Blood tests.  TREATMENT   Many ovarian cysts go away on their own without treatment. Your health care provider may want to check your cyst regularly for 2-3 months to see if it changes. For women in menopause, it is particularly important to monitor a cyst closely because of the higher rate of ovarian cancer in menopausal women. When treatment is needed, it may include any of the following:  · A procedure to drain the cyst (aspiration). This may be done using a long needle and ultrasound. It can also be done through a laparoscopic procedure. This involves using a thin, lighted tube with a tiny camera on the end (laparoscope) inserted through a small incision.  · Surgery to remove the whole cyst. This may be done using laparoscopic surgery or an open surgery involving a larger incision in the lower abdomen.  · Hormone treatment or birth control pills. These methods are sometimes used to help dissolve a cyst.  HOME CARE INSTRUCTIONS   · Only take over-the-counter or prescription medicines as directed by your health care provider.  · Follow up with your health care provider as directed.  · Get regular pelvic exams and Pap tests.  SEEK MEDICAL CARE IF:   · Your periods are late, irregular, or painful, or they stop.  · Your pelvic pain or abdominal pain does not go away.  · Your abdomen becomes larger or swollen.  · You have pressure on your bladder or trouble emptying your bladder completely.  · You have pain during sexual intercourse.  · You have feelings of fullness, pressure, or discomfort in your stomach.  · You lose weight for no apparent reason.  · You feel generally ill.  · You  become constipated.  · You lose your appetite.  · You develop acne.  · You have an increase in body and facial hair.  · You are gaining weight, without changing your exercise and eating habits.  · You think you are pregnant.  SEEK IMMEDIATE MEDICAL CARE IF:   · You have increasing abdominal pain.  · You feel sick to your stomach (nauseous), and you throw up (vomit).  · You develop a fever that comes on suddenly.  · You have abdominal pain during a bowel movement.  · Your menstrual periods become heavier than usual.  MAKE SURE YOU:  · Understand these instructions.  · Will watch your condition.  · Will get help right away if you are not doing well or get worse.     This information is not intended to replace advice given to you by your health care provider. Make sure you discuss any questions you have with your health care provider.     Document Released: 12/18/2006 Document Revised: 12/23/2014 Document Reviewed: 08/25/2014  [employer] Interactive Patient Education ©2016 [employer]

## 2017-08-05 NOTE — ED NOTES
.  Chief Complaint   Patient presents with   • Ovarian Cyst     8 cm ovarian cyst.  Not having periods for over three months. Occasional r sided cramping, sharp.       Seen at [facility] for same.  Instructed to follow up with OB/GYN, which patient did.  Unable to get appt for 1-2 months, instructed to come here for consult.    Patient educated on triage process and wait time.  Instructed to notify staff for worsening or new symptoms.  Placed in lobby.

## 2017-10-09 ENCOUNTER — OFFICE VISIT (OUTPATIENT)
Dept: MEDICAL GROUP | Facility: MEDICAL CENTER | Age: 27
End: 2017-10-09
Payer: COMMERCIAL

## 2017-10-09 VITALS
BODY MASS INDEX: 45.99 KG/M2 | HEIGHT: 67 IN | OXYGEN SATURATION: 95 % | DIASTOLIC BLOOD PRESSURE: 76 MMHG | RESPIRATION RATE: 16 BRPM | TEMPERATURE: 97.9 F | HEART RATE: 94 BPM | SYSTOLIC BLOOD PRESSURE: 122 MMHG | WEIGHT: 293 LBS

## 2017-10-09 DIAGNOSIS — Z00.00 ROUTINE GENERAL MEDICAL EXAMINATION AT A HEALTH CARE FACILITY: ICD-10-CM

## 2017-10-09 DIAGNOSIS — R10.32 LEFT LOWER QUADRANT PAIN: ICD-10-CM

## 2017-10-09 DIAGNOSIS — N83.202 CYST OF LEFT OVARY: ICD-10-CM

## 2017-10-09 PROCEDURE — 99203 OFFICE O/P NEW LOW 30 MIN: CPT | Performed by: NURSE PRACTITIONER

## 2017-10-09 ASSESSMENT — PATIENT HEALTH QUESTIONNAIRE - PHQ9: CLINICAL INTERPRETATION OF PHQ2 SCORE: 0

## 2017-10-09 ASSESSMENT — ENCOUNTER SYMPTOMS: ABDOMINAL PAIN: 1

## 2017-10-09 NOTE — PROGRESS NOTES
Subjective:      Flaquita Mittal is a 27 y.o. female who presents with New Patient and Orders Needed (labs work)            HPI Flaquita MittalIs here today to establish care as well as needing GYN referral for abdominal pain and wishing referral for weight loss.      1. Cyst of left ovary  Review of chart shows patient has been to the emergency room at least twice since February of this year for left lower abdominal pain. She was found to have a left ovarian cyst which was growing in size and was now 9 cm. Because this was causing recurring pain she was advised to see a gynecologist but she has been having difficulty getting an appointment. She states the pain is recurring and therefore would like to have this removed or treated in some way.    2. Left lower quadrant pain  Patient states the pain in the left lower abdomen is recurring and nothing makes it better or worse. It is not currently bothering her.    3. BMI 50.0-59.9, adult (CMS-HCC)  Patient reports she has been obese all of her life and would like to be checked for thyroid disease. If this is normal, she would like to go to a bariatric surgeon to look at other options.    4. Routine general medical examination at a health care facility  Patient due for routine blood work.  Current Outpatient Prescriptions   Medication Sig Dispense Refill   • ondansetron (ZOFRAN) 4 MG Tab tablet Take 1 Tab by mouth every 8 hours as needed (FOR NAUSEA OR VIMITING) for up to 6 doses. 6 Each 2   • ondansetron (ZOFRAN) 4 MG Tab tablet Take 1 Tab by mouth every 8 hours as needed (FOR NAUSEA OR VIMITING) for up to 6 doses. 6 Each 2   • promethazine (PHENERGAN) 25 MG Tab Take 1 Tab by mouth every 6 hours as needed for Nausea/Vomiting. 15 Tab 0     No current facility-administered medications for this visit.      Social History   Substance Use Topics   • Smoking status: Current Every Day Smoker     Packs/day: 0.10     Types: Cigarettes   • Smokeless tobacco: Never Used   •  "Alcohol use No     Family History   Problem Relation Age of Onset   • No Known Problems Mother    • No Known Problems Father    No past medical history on file.    Review of Systems   Gastrointestinal: Positive for abdominal pain.   All other systems reviewed and are negative.         Objective:     /76   Pulse 94   Temp 36.6 °C (97.9 °F)   Resp 16   Ht 1.702 m (5' 7\")   Wt (!) 150.6 kg (332 lb)   LMP 09/17/2017   SpO2 95%   BMI 52.00 kg/m²      Physical Exam   Constitutional: She is oriented to person, place, and time. She appears well-developed and well-nourished. No distress.   HENT:   Head: Normocephalic and atraumatic.   Right Ear: External ear normal.   Left Ear: External ear normal.   Nose: Nose normal.   Eyes: Right eye exhibits no discharge. Left eye exhibits no discharge.   Neck: Normal range of motion. Neck supple. No thyromegaly present.   Cardiovascular: Normal rate, regular rhythm and normal heart sounds.  Exam reveals no gallop and no friction rub.    No murmur heard.  Pulmonary/Chest: Effort normal and breath sounds normal. She has no wheezes. She has no rales.   Abdominal:   Patient points to her left groin area as to where the pain occurs although it is not tender today.   Musculoskeletal: She exhibits no edema or tenderness.   Neurological: She is alert and oriented to person, place, and time. She displays normal reflexes.   Skin: Skin is warm and dry. No rash noted. She is not diaphoretic.   Psychiatric: She has a normal mood and affect. Her behavior is normal. Judgment and thought content normal.   Nursing note and vitals reviewed.              Assessment/Plan:     1. Cyst of left ovary  This is a recurring problem for patient and she should have seen a gynecologist a while back for treatment but unfortunately she has been having difficulty getting an appointment so I will place a referral today to see if this needs to be drained or removed because of its recurrence and frequent " need to go to the emergency room.  - REFERRAL TO GYNECOLOGY    2. Left lower quadrant pain  Most likely secondary to large 9 cm probable ovarian cyst.  - REFERRAL TO GYNECOLOGY    3. BMI 50.0-59.9, adult (CMS-[facility])  Patient would like to speak to a bariatric surgeon to see if she is eligible for gastric sleeve.  - Patient identified as having weight management issue.  Appropriate orders and counseling given.  - REFERRAL TO BARIATRIC SURGERY    4. Routine general medical examination at a health care facility  Patient will follow back if anything is abnormal.  - COMP METABOLIC PANEL; Future  - LIPID PROFILE; Future  - TSH; Future

## 2019-02-08 DIAGNOSIS — Z01.810 PRE-OPERATIVE CARDIOVASCULAR EXAMINATION: ICD-10-CM

## 2019-02-08 DIAGNOSIS — Z01.812 PRE-PROCEDURAL LABORATORY EXAMINATION: ICD-10-CM

## 2019-02-08 LAB
ALBUMIN SERPL BCP-MCNC: 4 G/DL (ref 3.2–4.9)
ALBUMIN/GLOB SERPL: 1.3 G/DL
ALP SERPL-CCNC: 101 U/L (ref 30–99)
ALT SERPL-CCNC: 18 U/L (ref 2–50)
ANION GAP SERPL CALC-SCNC: 10 MMOL/L (ref 0–11.9)
AST SERPL-CCNC: 14 U/L (ref 12–45)
B-HCG SERPL-ACNC: <0.6 MIU/ML (ref 0–5)
BASOPHILS # BLD AUTO: 0.3 % (ref 0–1.8)
BASOPHILS # BLD: 0.03 K/UL (ref 0–0.12)
BILIRUB SERPL-MCNC: 0.7 MG/DL (ref 0.1–1.5)
BUN SERPL-MCNC: 10 MG/DL (ref 8–22)
CALCIUM SERPL-MCNC: 9.3 MG/DL (ref 8.5–10.5)
CHLORIDE SERPL-SCNC: 108 MMOL/L (ref 96–112)
CO2 SERPL-SCNC: 23 MMOL/L (ref 20–33)
CREAT SERPL-MCNC: 0.64 MG/DL (ref 0.5–1.4)
EKG IMPRESSION: NORMAL
EOSINOPHIL # BLD AUTO: 0.06 K/UL (ref 0–0.51)
EOSINOPHIL NFR BLD: 0.5 % (ref 0–6.9)
ERYTHROCYTE [DISTWIDTH] IN BLOOD BY AUTOMATED COUNT: 46.5 FL (ref 35.9–50)
GLOBULIN SER CALC-MCNC: 3.2 G/DL (ref 1.9–3.5)
GLUCOSE SERPL-MCNC: 104 MG/DL (ref 65–99)
HCT VFR BLD AUTO: 42.2 % (ref 37–47)
HGB BLD-MCNC: 13.6 G/DL (ref 12–16)
IMM GRANULOCYTES # BLD AUTO: 0.04 K/UL (ref 0–0.11)
IMM GRANULOCYTES NFR BLD AUTO: 0.4 % (ref 0–0.9)
LYMPHOCYTES # BLD AUTO: 3.31 K/UL (ref 1–4.8)
LYMPHOCYTES NFR BLD: 30.3 % (ref 22–41)
MCH RBC QN AUTO: 27.3 PG (ref 27–33)
MCHC RBC AUTO-ENTMCNC: 32.2 G/DL (ref 33.6–35)
MCV RBC AUTO: 84.7 FL (ref 81.4–97.8)
MONOCYTES # BLD AUTO: 0.67 K/UL (ref 0–0.85)
MONOCYTES NFR BLD AUTO: 6.1 % (ref 0–13.4)
NEUTROPHILS # BLD AUTO: 6.83 K/UL (ref 2–7.15)
NEUTROPHILS NFR BLD: 62.4 % (ref 44–72)
NRBC # BLD AUTO: 0 K/UL
NRBC BLD-RTO: 0 /100 WBC
PLATELET # BLD AUTO: 343 K/UL (ref 164–446)
PMV BLD AUTO: 11.2 FL (ref 9–12.9)
POTASSIUM SERPL-SCNC: 3.7 MMOL/L (ref 3.6–5.5)
PROT SERPL-MCNC: 7.2 G/DL (ref 6–8.2)
RBC # BLD AUTO: 4.98 M/UL (ref 4.2–5.4)
SODIUM SERPL-SCNC: 141 MMOL/L (ref 135–145)
WBC # BLD AUTO: 10.9 K/UL (ref 4.8–10.8)

## 2019-02-08 PROCEDURE — 93010 ELECTROCARDIOGRAM REPORT: CPT | Performed by: INTERNAL MEDICINE

## 2019-02-08 PROCEDURE — 80053 COMPREHEN METABOLIC PANEL: CPT

## 2019-02-08 PROCEDURE — 36415 COLL VENOUS BLD VENIPUNCTURE: CPT

## 2019-02-08 PROCEDURE — 84702 CHORIONIC GONADOTROPIN TEST: CPT

## 2019-02-08 PROCEDURE — 85025 COMPLETE CBC W/AUTO DIFF WBC: CPT

## 2019-02-08 PROCEDURE — 93005 ELECTROCARDIOGRAM TRACING: CPT

## 2019-02-15 NOTE — H&P
DATE OF PLANNED PROCEDURE:  02/20/2019    PLANNED PROCEDURE:  Diagnostic laparoscopy with removal of left   salpingo-oophorectomy.    HISTORY AND PHYSICAL:  This is a 29-year-old G0, whose last menstrual period   was last month.  Patient has a long history of abnormal menstrual cycles and   abdominal pain.  Patient was sent to our office for evaluation of a 9 cm   cystic structure in the left side of the pelvis, likely an ovarian cyst.  The   patient had been seen in the emergency room complaining of abdominal pain.    Currently, the patient is still complaining of abdominal pain and irregular   vaginal bleeding.  Patient denies any excessive pain, nausea, vomiting,   fevers, chest pains, or shortness of breath.    REVIEW OF SYSTEMS:  As noted in HPI.    PAST MEDICAL HISTORY:  Negative.    PAST SURGICAL HISTORY:  Cholecystectomy.    MEDICATIONS:  None.    ALLERGIES:  None.    SOCIAL HISTORY:  Denies tobacco, ethanol, or drugs.    FAMILY HISTORY:  Significant for diabetes, high blood pressure, and kidney   problems.    PHYSICAL EXAMINATION:  GENERAL:  She is a morbidly obese female weighing 321 pounds with a BMI of   50.4.  VITAL SIGNS:  Blood pressure 116/78.  HEART:  Regular rate and rhythm.  LUNGS:  Clear to auscultation bilaterally.  ABDOMEN:  Obese, but nontender with no signs of rebound or guarding.  EXTREMITIES:  Show no clubbing, cyanosis, or edema.  GYNECOLOGIC:  On gynecologic exam, patient has normal external female   genitalia.  Cervix is normal.  Uterus is small and nontender and mobile.    There is a large left-sided to midline pelvic mass that is slightly tender.    PERTINENT PREOPERATIVE WORKUP:  The patient had an ultrasound performed in our   office in December showing a normal-appearing uterus with an endometrial   stripe of 11 mm.  On the left adnexa, there was a 15x16 simple cyst seen on   the left ovary superior to the uterine fundus.  Right adnexa was not seen.    Pap smear performed was  normal, negative GC chlamydia.  Patient also had   endometrial biopsy, which was proliferative endometria with no atypia or   hyperplasia, and also had a CA-125 performed, which was 9.5.    ASSESSMENT AND PLAN:  This is a 29-year-old female with a large left-sided   simple ovarian cyst.  I discussed with the patient indications for surgical   procedure.  Currently, she is consented for diagnostic laparoscopy with   drainage of left ovarian cyst and left salpingo-oophorectomy.  I discussed   with the patient the risk of laparoscopy, which can be infection, bleeding,   scarring; damage to bowel, bladder, ureters; emergency blood transfusion.  I   also discussed with the patient at great length that we may not be able to   complete this procedure laparoscopically.  Given the patient's body habitus   and the size of the cyst, it may be impossible for us to safely complete this   procedure laparoscopically, in which case it will be converted to an open   laparotomy with an LSO.  I discussed with the patient indications for   conversion of the procedure to open procedure would be due to inability to   complete the procedure safely or technically due to patient's body habitus,   possible inability to place the patient in Trendelenburg secondary to body   habitus, she may not be able to be ventilated well or the sheer size of the   ovarian lesion.  We will attempt primarily for a laparoscopic   salpingo-oophorectomy with drainage of left ovarian cyst with the possibility   of conversion to an open procedure.  Patient voiced understanding of the risks   as described and agrees to proceed as scheduled.       ____________________________________     MD CARRIE Temple / ELIS    DD:  02/15/2019 12:46:19  DT:  02/15/2019 13:00:50    D#:  [id number]  Job#:  [id number]

## 2019-02-20 ENCOUNTER — HOSPITAL ENCOUNTER (OUTPATIENT)
Facility: MEDICAL CENTER | Age: 29
End: 2019-02-20
Attending: OBSTETRICS & GYNECOLOGY | Admitting: OBSTETRICS & GYNECOLOGY
Payer: COMMERCIAL

## 2019-02-20 VITALS
BODY MASS INDEX: 44.41 KG/M2 | HEART RATE: 98 BPM | OXYGEN SATURATION: 97 % | WEIGHT: 293 LBS | SYSTOLIC BLOOD PRESSURE: 137 MMHG | RESPIRATION RATE: 16 BRPM | TEMPERATURE: 98.2 F | DIASTOLIC BLOOD PRESSURE: 67 MMHG | HEIGHT: 68 IN

## 2019-02-20 DIAGNOSIS — Z98.890 POST-OPERATIVE STATE: ICD-10-CM

## 2019-02-20 LAB
B-HCG FREE SERPL-ACNC: <5 MIU/ML
PATHOLOGY CONSULT NOTE: NORMAL

## 2019-02-20 PROCEDURE — 502648 HCHG APPLICATOR, EVICEL: Performed by: OBSTETRICS & GYNECOLOGY

## 2019-02-20 PROCEDURE — 88307 TISSUE EXAM BY PATHOLOGIST: CPT

## 2019-02-20 PROCEDURE — 160029 HCHG SURGERY MINUTES - 1ST 30 MINS LEVEL 4: Performed by: OBSTETRICS & GYNECOLOGY

## 2019-02-20 PROCEDURE — 501577 HCHG TROCAR, STEP 11MM: Performed by: OBSTETRICS & GYNECOLOGY

## 2019-02-20 PROCEDURE — A4338 INDWELLING CATHETER LATEX: HCPCS | Performed by: OBSTETRICS & GYNECOLOGY

## 2019-02-20 PROCEDURE — A9270 NON-COVERED ITEM OR SERVICE: HCPCS | Performed by: STUDENT IN AN ORGANIZED HEALTH CARE EDUCATION/TRAINING PROGRAM

## 2019-02-20 PROCEDURE — 501586 HCHG TROCAR, THRD SPIKE 5X55: Performed by: OBSTETRICS & GYNECOLOGY

## 2019-02-20 PROCEDURE — 160046 HCHG PACU - 1ST 60 MINS PHASE II: Performed by: OBSTETRICS & GYNECOLOGY

## 2019-02-20 PROCEDURE — 501568 HCHG TROCAR, BLUNTPORT 12MM: Performed by: OBSTETRICS & GYNECOLOGY

## 2019-02-20 PROCEDURE — 500886 HCHG PACK, LAPAROSCOPY: Performed by: OBSTETRICS & GYNECOLOGY

## 2019-02-20 PROCEDURE — 160009 HCHG ANES TIME/MIN: Performed by: OBSTETRICS & GYNECOLOGY

## 2019-02-20 PROCEDURE — 160048 HCHG OR STATISTICAL LEVEL 1-5: Performed by: OBSTETRICS & GYNECOLOGY

## 2019-02-20 PROCEDURE — 500002 HCHG ADHESIVE, DERMABOND: Performed by: OBSTETRICS & GYNECOLOGY

## 2019-02-20 PROCEDURE — 110454 HCHG SHELL REV 250: Performed by: OBSTETRICS & GYNECOLOGY

## 2019-02-20 PROCEDURE — 160002 HCHG RECOVERY MINUTES (STAT): Performed by: OBSTETRICS & GYNECOLOGY

## 2019-02-20 PROCEDURE — 160041 HCHG SURGERY MINUTES - EA ADDL 1 MIN LEVEL 4: Performed by: OBSTETRICS & GYNECOLOGY

## 2019-02-20 PROCEDURE — 700102 HCHG RX REV CODE 250 W/ 637 OVERRIDE(OP): Performed by: STUDENT IN AN ORGANIZED HEALTH CARE EDUCATION/TRAINING PROGRAM

## 2019-02-20 PROCEDURE — 160025 RECOVERY II MINUTES (STATS): Performed by: OBSTETRICS & GYNECOLOGY

## 2019-02-20 PROCEDURE — 160047 HCHG PACU  - EA ADDL 30 MINS PHASE II: Performed by: OBSTETRICS & GYNECOLOGY

## 2019-02-20 PROCEDURE — 700101 HCHG RX REV CODE 250

## 2019-02-20 PROCEDURE — 501838 HCHG SUTURE GENERAL: Performed by: OBSTETRICS & GYNECOLOGY

## 2019-02-20 PROCEDURE — 501399 HCHG SPECIMAN BAG, ENDO CATC: Performed by: OBSTETRICS & GYNECOLOGY

## 2019-02-20 PROCEDURE — 502703 HCHG DEVICE, LIGASURE V SEALER: Performed by: OBSTETRICS & GYNECOLOGY

## 2019-02-20 PROCEDURE — 160035 HCHG PACU - 1ST 60 MINS PHASE I: Performed by: OBSTETRICS & GYNECOLOGY

## 2019-02-20 PROCEDURE — 160036 HCHG PACU - EA ADDL 30 MINS PHASE I: Performed by: OBSTETRICS & GYNECOLOGY

## 2019-02-20 PROCEDURE — 700111 HCHG RX REV CODE 636 W/ 250 OVERRIDE (IP)

## 2019-02-20 PROCEDURE — 700111 HCHG RX REV CODE 636 W/ 250 OVERRIDE (IP): Performed by: STUDENT IN AN ORGANIZED HEALTH CARE EDUCATION/TRAINING PROGRAM

## 2019-02-20 PROCEDURE — 84702 CHORIONIC GONADOTROPIN TEST: CPT

## 2019-02-20 RX ORDER — HYDROMORPHONE HYDROCHLORIDE 1 MG/ML
0.2 INJECTION, SOLUTION INTRAMUSCULAR; INTRAVENOUS; SUBCUTANEOUS
Status: DISCONTINUED | OUTPATIENT
Start: 2019-02-20 | End: 2019-02-20 | Stop reason: HOSPADM

## 2019-02-20 RX ORDER — BUPIVACAINE HYDROCHLORIDE AND EPINEPHRINE 2.5; 5 MG/ML; UG/ML
INJECTION, SOLUTION EPIDURAL; INFILTRATION; INTRACAUDAL; PERINEURAL
Status: DISCONTINUED | OUTPATIENT
Start: 2019-02-20 | End: 2019-02-20 | Stop reason: HOSPADM

## 2019-02-20 RX ORDER — ONDANSETRON 2 MG/ML
4 INJECTION INTRAMUSCULAR; INTRAVENOUS
Status: DISCONTINUED | OUTPATIENT
Start: 2019-02-20 | End: 2019-02-20 | Stop reason: HOSPADM

## 2019-02-20 RX ORDER — MEPERIDINE HYDROCHLORIDE 25 MG/ML
12.5 INJECTION INTRAMUSCULAR; INTRAVENOUS; SUBCUTANEOUS
Status: DISCONTINUED | OUTPATIENT
Start: 2019-02-20 | End: 2019-02-20 | Stop reason: HOSPADM

## 2019-02-20 RX ORDER — HALOPERIDOL 5 MG/ML
1 INJECTION INTRAMUSCULAR
Status: DISCONTINUED | OUTPATIENT
Start: 2019-02-20 | End: 2019-02-20 | Stop reason: HOSPADM

## 2019-02-20 RX ORDER — ACETAMINOPHEN 500 MG
1000 TABLET ORAL ONCE
Status: COMPLETED | OUTPATIENT
Start: 2019-02-20 | End: 2019-02-20

## 2019-02-20 RX ORDER — SODIUM CHLORIDE, SODIUM LACTATE, POTASSIUM CHLORIDE, CALCIUM CHLORIDE 600; 310; 30; 20 MG/100ML; MG/100ML; MG/100ML; MG/100ML
INJECTION, SOLUTION INTRAVENOUS ONCE
Status: COMPLETED | OUTPATIENT
Start: 2019-02-20 | End: 2019-02-20

## 2019-02-20 RX ORDER — HYDROMORPHONE HYDROCHLORIDE 1 MG/ML
0.4 INJECTION, SOLUTION INTRAMUSCULAR; INTRAVENOUS; SUBCUTANEOUS
Status: DISCONTINUED | OUTPATIENT
Start: 2019-02-20 | End: 2019-02-20 | Stop reason: HOSPADM

## 2019-02-20 RX ORDER — SODIUM CHLORIDE, SODIUM LACTATE, POTASSIUM CHLORIDE, CALCIUM CHLORIDE 600; 310; 30; 20 MG/100ML; MG/100ML; MG/100ML; MG/100ML
INJECTION, SOLUTION INTRAVENOUS CONTINUOUS
Status: DISCONTINUED | OUTPATIENT
Start: 2019-02-20 | End: 2019-02-20 | Stop reason: HOSPADM

## 2019-02-20 RX ORDER — OXYCODONE HCL 5 MG/5 ML
10 SOLUTION, ORAL ORAL
Status: COMPLETED | OUTPATIENT
Start: 2019-02-20 | End: 2019-02-20

## 2019-02-20 RX ORDER — HYDROMORPHONE HYDROCHLORIDE 1 MG/ML
0.1 INJECTION, SOLUTION INTRAMUSCULAR; INTRAVENOUS; SUBCUTANEOUS
Status: DISCONTINUED | OUTPATIENT
Start: 2019-02-20 | End: 2019-02-20 | Stop reason: HOSPADM

## 2019-02-20 RX ORDER — IBUPROFEN 800 MG/1
800 TABLET ORAL EVERY 8 HOURS PRN
Qty: 30 TAB | Refills: 1 | Status: SHIPPED | OUTPATIENT
Start: 2019-02-20 | End: 2020-02-04

## 2019-02-20 RX ORDER — OXYCODONE HYDROCHLORIDE AND ACETAMINOPHEN 5; 325 MG/1; MG/1
1-2 TABLET ORAL EVERY 4 HOURS PRN
Qty: 30 TAB | Refills: 0 | Status: SHIPPED | OUTPATIENT
Start: 2019-02-20 | End: 2019-02-27

## 2019-02-20 RX ORDER — DIPHENHYDRAMINE HYDROCHLORIDE 50 MG/ML
12.5 INJECTION INTRAMUSCULAR; INTRAVENOUS
Status: DISCONTINUED | OUTPATIENT
Start: 2019-02-20 | End: 2019-02-20 | Stop reason: HOSPADM

## 2019-02-20 RX ORDER — OXYCODONE HCL 5 MG/5 ML
5 SOLUTION, ORAL ORAL
Status: COMPLETED | OUTPATIENT
Start: 2019-02-20 | End: 2019-02-20

## 2019-02-20 RX ORDER — CELECOXIB 200 MG/1
400 CAPSULE ORAL ONCE
Status: COMPLETED | OUTPATIENT
Start: 2019-02-20 | End: 2019-02-20

## 2019-02-20 RX ADMIN — OXYCODONE HYDROCHLORIDE 10 MG: 5 SOLUTION ORAL at 10:05

## 2019-02-20 RX ADMIN — ACETAMINOPHEN 1000 MG: 500 TABLET, FILM COATED ORAL at 07:10

## 2019-02-20 RX ADMIN — SODIUM CHLORIDE, SODIUM LACTATE, POTASSIUM CHLORIDE, CALCIUM CHLORIDE 1000 ML: 600; 310; 30; 20 INJECTION, SOLUTION INTRAVENOUS at 06:35

## 2019-02-20 RX ADMIN — CELECOXIB 400 MG: 200 CAPSULE ORAL at 07:15

## 2019-02-20 RX ADMIN — FENTANYL CITRATE 50 MCG: 50 INJECTION, SOLUTION INTRAMUSCULAR; INTRAVENOUS at 10:05

## 2019-02-20 NOTE — DISCHARGE INSTRUCTIONS
ACTIVITY: Rest and take it easy for the first 24 hours.  A responsible adult is recommended to remain with you during that time.  It is normal to feel sleepy.  We encourage you to not do anything that requires balance, judgment or coordination.    MILD FLU-LIKE SYMPTOMS ARE NORMAL. YOU MAY EXPERIENCE GENERALIZED MUSCLE ACHES, THROAT IRRITATION, HEADACHE AND/OR SOME NAUSEA.    FOR 24 HOURS DO NOT:  Drive, operate machinery or run household appliances.  Drink beer or alcoholic beverages.   Make important decisions or sign legal documents.    SPECIAL INSTRUCTIONS: See attached sheet.    DIET: To avoid nausea, slowly advance diet as tolerated, avoiding spicy or greasy foods for the first day.  Add more substantial food to your diet according to your physician's instructions.  Babies can be fed formula or breast milk as soon as they are hungry.  INCREASE FLUIDS AND FIBER TO AVOID CONSTIPATION.    SURGICAL DRESSING/BATHING: Okay to shower after 24 hours.  No hot tubs, no tub baths, and no swimming until cleared by your doctor.    FOLLOW-UP APPOINTMENT:  A follow-up appointment should be arranged with your doctor; call to schedule.    You should CALL YOUR PHYSICIAN if you develop:  Fever greater than 101 degrees F.  Pain not relieved by medication, or persistent nausea or vomiting.  Excessive bleeding (blood soaking through dressing) or unexpected drainage from the wound.  Extreme redness or swelling around the incision site, drainage of pus or foul smelling drainage.  Inability to urinate or empty your bladder within 8 hours.  Problems with breathing or chest pain.    You should call 911 if you develop problems with breathing or chest pain.  If you are unable to contact your doctor or surgical center, you should go to the nearest emergency room or urgent care center.  Physician's telephone #: [phone]    If any questions arise, call your doctor.  If your doctor is not available, please feel free to call the Surgical  Center at [phone].  The Center is open Monday through Friday from 7AM to 7PM.  You can also call the HEALTH HOTLINE open 24 hours/day, 7 days/week and speak to a nurse at [phone], or toll free at [phone].    A registered nurse may call you a few days after your surgery to see how you are doing after your procedure.    MEDICATIONS: Resume taking daily medication.  Take prescribed pain medication with food.  If no medication is prescribed, you may take non-aspirin pain medication if needed.  PAIN MEDICATION CAN BE VERY CONSTIPATING.  Take a stool softener or laxative such as senokot, pericolace, or milk of magnesia if needed.    Prescription given for percocet and motrin.  Last pain medication given at 10:00am.    Next dose of percocet due at 2:00pm.    Next dose of ibuprofen/motrin due at 3:00pm.    If your physician has prescribed pain medication that includes Acetaminophen (Tylenol), do not take additional Acetaminophen (Tylenol) while taking the prescribed medication.    Depression / Suicide Risk    As you are discharged from this Critical access hospital facility, it is important to learn how to keep safe from harming yourself.    Recognize the warning signs:  · Abrupt changes in personality, positive or negative- including increase in energy   · Giving away possessions  · Change in eating patterns- significant weight changes-  positive or negative  · Change in sleeping patterns- unable to sleep or sleeping all the time   · Unwillingness or inability to communicate  · Depression  · Unusual sadness, discouragement and loneliness  · Talk of wanting to die  · Neglect of personal appearance   · Rebelliousness- reckless behavior  · Withdrawal from people/activities they love  · Confusion- inability to concentrate     If you or a loved one observes any of these behaviors or has concerns about self-harm, here's what you can do:  · Talk about it- your feelings and reasons for harming yourself  · Remove any means  that you might use to hurt yourself (examples: pills, rope, extension cords, firearm)  · Get professional help from the community (Mental Health, Substance Abuse, psychological counseling)  · Do not be alone:Call your Safe Contact- someone whom you trust who will be there for you.  · Call your local CRISIS HOTLINE 265-2504 or 287-358-8927  · Call your local Children's Mobile Crisis Response Team [place] (607) 326-3661 or www.[employer]  · Call the toll free National Suicide Prevention Hotlines   · National Suicide Prevention Lifeline 883-270-MCOI (1674)  · National Hope Line Network 800-SUICIDE (280-3663)

## 2019-02-20 NOTE — OR NURSING
0924- Pt arrives from OR and report received.  Incisions to low abd CDI.    0945- Pt sleeping, no distress noted.    1005- Pt more awake, c/o pain 8/10, medicated with 50mcg IV fent and 10mg oxy, pt tolerating sips of water.    1040- Pt sleeping, no distress noted.    1110- Pt continues to sleep.  Weaning down oxygen.    1145- Pt sleeping, no distress noted.  Pt tolerating room air.    1220- Pt more awake, calling her ride to come and get her.    1227- Pt changing into her clothes.    1235- Pt up to bathroom, voided without difficulty, placed in recliner, waiting for ride.    1250- Brother brought to bedside.  Pt and brother provided with d/c paperwork and instructions, scripts for percocet and motrin.  All questions answered.    1253- IV removed, pt ambulated out with steady gait.

## 2019-02-20 NOTE — OR SURGEON
Immediate Post OP Note    PreOp Diagnosis: left ovarian cyst      PostOp Diagnosis: left paratubal cyst    Procedure(s):  PELVISCOPY - DIAGNOSTIC - Wound Class: Clean Contaminated  Left cystectomy and salpingectomy- Wound Class: Clean Contaminated    Surgeon(s):  DARBY Acevedo M.D.    Anesthesiologist/Type of Anesthesia:  Anesthesiologist: Momo Lemons M.D./General    Surgical Staff:  Circulator: Alex Mays R.N.  Scrub Person: Tom Molina    Specimens removed if any:  ID Type Source Tests Collected by Time Destination   A : left ovarian cyst possible fallopian tube Cyst Cyst PATHOLOGY SPECIMEN Nadege Jaimes M.D. 2/20/2019  9:01 AM        Estimated Blood Loss: minimla    Findings: large 15 cm left paraovarian cystserous fluid    Complications: none        2/20/2019 9:16 AM Nadege Jaimes M.D.

## 2019-02-20 NOTE — OP REPORT
DATE OF SERVICE:  02/20/2019    PREOPERATIVE DIAGNOSIS:  Left ovarian cyst.    POSTOPERATIVE DIAGNOSIS:  Left paraovarian paratubal cyst.    PROCEDURE PERFORMED:  Diagnostic laparoscopy with left cystectomy and   salpingectomy.    SURGEON:  Nadege Jaimes MD    ASSISTANT:  Aldo Farah MD    ANESTHESIA:  General endotracheal provided.    ANESTHESIOLOGIST:  Momo Lemons MD    ESTIMATED BLOOD LOSS:  Minimal.    INTRAOPERATIVE FINDINGS:  A markedly enlarged cyst approximating the left   ovary and tube, measuring probably 15-16 cm in size.    PROCEDURE IN DETAIL:  After informed consent obtained, the patient was taken   to the operating room and general anesthesia was found to be adequate.  She   was prepped and draped in dorsal lithotomy position and a MediaHound uterine   manipulator placed inside the uterus for uterine manipulation.  At that point,   attention was turned to the abdomen.  An infraumbilical incision was made and   carried down to the underlying layer of fascia with the knife.  The fascia   was then grasped and elevated between 2 Kocher clamps and entered sharply with   the Metzenbaum scissors.  The peritoneum was entered bluntly.  The 10 mm   blunt tipped trocar was placed into the abdomen and intraabdominal and   peritoneal placement was confirmed using a laparoscope.  Pneumoperitoneum was   then obtained.  At that point, the abdomen was surveyed.  There was a large   cyst on the left side of the ovary.  It was difficult to tell how it was   associated with the ovary or tube due to its size, so a 5-mm trocar was placed   in the lower left quadrant and a needle used to puncture the cyst and   withdraw and aspirate some of the cyst fluid.  Once the cyst was slightly   deflated, an incision was made in the cyst with EndoShears and the suction    was placed into the cyst incision and the remaining fluid was then   removed.  At that point, the cyst was observed and it was noted to be    paratubal and paraovarian.  At that point, it was opened.  Incisions were made   along the peritoneum and along the cyst wall.  The cyst wall was then   ultimately grasped and pulled through the opening in the peritoneum and the   cyst wall was removed in its entirety using both the LigaSure and blunt   dissection.  Prior to removing the cyst wall, a 10-mm trocar was also placed   in the right lower quadrant for retraction.  At that point, once the cyst had   been completely excised, it was placed into a 10-mm Endobag and removed   through the 10 mm port.  Finally, the area was irrigated.  Hemostasis was   found to be adequate and Evicel was placed over the area where the cyst had   been removed.  Hemostasis was definitely adequate at that time.  Finally, all   the instruments were removed from the abdomen under direct visualization.  The   fascia at the 10 mm port was closed with 0 Vicryl as well as the lateral   right-sided 10 mm port and the skin was closed with a 4-0 Vicryl and Dermabond   was applied.  The sponge, lap, and needle counts were all correct x2.  The   patient was taken to the recovery room in good condition.       ____________________________________     MD CARRIE Temple / ELIS    DD:  02/20/2019 09:28:09  DT:  02/20/2019 10:06:30    D#:  [id number]  Job#:  [id number]

## 2020-02-04 ENCOUNTER — OFFICE VISIT (OUTPATIENT)
Dept: MEDICAL GROUP | Facility: MEDICAL CENTER | Age: 30
End: 2020-02-04
Payer: COMMERCIAL

## 2020-02-04 VITALS
HEIGHT: 67 IN | BODY MASS INDEX: 45.99 KG/M2 | RESPIRATION RATE: 16 BRPM | WEIGHT: 293 LBS | DIASTOLIC BLOOD PRESSURE: 74 MMHG | HEART RATE: 75 BPM | SYSTOLIC BLOOD PRESSURE: 118 MMHG | OXYGEN SATURATION: 95 %

## 2020-02-04 DIAGNOSIS — B35.1 TINEA UNGUIUM: ICD-10-CM

## 2020-02-04 DIAGNOSIS — Z87.42 HISTORY OF PCOS: ICD-10-CM

## 2020-02-04 DIAGNOSIS — Z00.00 ROUTINE GENERAL MEDICAL EXAMINATION AT A HEALTH CARE FACILITY: ICD-10-CM

## 2020-02-04 PROCEDURE — 99395 PREV VISIT EST AGE 18-39: CPT | Performed by: NURSE PRACTITIONER

## 2020-02-04 ASSESSMENT — PATIENT HEALTH QUESTIONNAIRE - PHQ9: CLINICAL INTERPRETATION OF PHQ2 SCORE: 0

## 2020-02-04 NOTE — PROGRESS NOTES
Subjective:      Flaquita Mittal is a 29 y.o. female who presents with Annual Exam        CC: Patient here today for yearly wellness exam as well as requesting referral to podiatry for toenail fungus and needs lab work.    HPI       1. Routine general medical examination at a health care facility  I have not seen patient since her initial visit here in October 2017 and she states since then she has felt well and has been exercising and dieting and losing weight.    2. Tinea unguium  Patient reports toenail fungus of her large nails and would like to treat this.    3. History of PCOS  Patient states she was diagnosed with PCO S by her gynecologist and was on metformin but felt she no longer needed it.  She is not on birth control pills or statin.  She has not had a recent lab work but fasting blood sugar in the past has been mildly elevated.  Past Medical History:   Diagnosis Date   • Ovarian cyst 02/08/2009    Left     Social History     Socioeconomic History   • Marital status: Single     Spouse name: Not on file   • Number of children: Not on file   • Years of education: Not on file   • Highest education level: Not on file   Occupational History   • Not on file   Social Needs   • Financial resource strain: Not on file   • Food insecurity:     Worry: Not on file     Inability: Not on file   • Transportation needs:     Medical: Not on file     Non-medical: Not on file   Tobacco Use   • Smoking status: Former Smoker     Packs/day: 0.10     Types: Cigarettes   • Smokeless tobacco: Never Used   • Tobacco comment: Quit 2017   Substance and Sexual Activity   • Alcohol use: No   • Drug use: No   • Sexual activity: Not Currently   Lifestyle   • Physical activity:     Days per week: Not on file     Minutes per session: Not on file   • Stress: Not on file   Relationships   • Social connections:     Talks on phone: Not on file     Gets together: Not on file     Attends Advent service: Not on file     Active  "member of club or organization: Not on file     Attends meetings of clubs or organizations: Not on file     Relationship status: Not on file   • Intimate partner violence:     Fear of current or ex partner: Not on file     Emotionally abused: Not on file     Physically abused: Not on file     Forced sexual activity: Not on file   Other Topics Concern   • Not on file   Social History Narrative   • Not on file     No current outpatient medications on file.     No current facility-administered medications for this visit.      Family History   Problem Relation Age of Onset   • No Known Problems Mother    • No Known Problems Father          Review of Systems   Skin: Positive for rash.   All other systems reviewed and are negative.         Objective:     /74 (BP Location: Right arm, Patient Position: Sitting, BP Cuff Size: Adult)   Pulse 75   Resp 16   Ht 1.702 m (5' 7\")   Wt (!) 140.6 kg (310 lb)   SpO2 95%   BMI 48.55 kg/m²      Physical Exam  Vitals signs and nursing note reviewed.   Constitutional:       General: She is not in acute distress.     Appearance: She is well-developed. She is not diaphoretic.   HENT:      Head: Normocephalic and atraumatic.      Right Ear: External ear normal.      Left Ear: External ear normal.      Nose: Nose normal.   Eyes:      General:         Right eye: No discharge.         Left eye: No discharge.   Neck:      Musculoskeletal: Normal range of motion and neck supple.      Thyroid: No thyromegaly.   Cardiovascular:      Rate and Rhythm: Normal rate and regular rhythm.      Heart sounds: Normal heart sounds. No murmur. No friction rub. No gallop.    Pulmonary:      Effort: Pulmonary effort is normal.      Breath sounds: Normal breath sounds. No wheezing or rales.   Musculoskeletal:         General: No tenderness.   Feet:      Comments: Thickening and yellowing of the large toenails  Skin:     General: Skin is warm and dry.      Findings: No rash.   Neurological:      Mental " Status: She is alert and oriented to person, place, and time.      Deep Tendon Reflexes: Reflexes normal.   Psychiatric:         Behavior: Behavior normal.         Thought Content: Thought content normal.         Judgment: Judgment normal.                 Assessment/Plan:       1. Routine general medical examination at a health care facility  I recommended some routine lab work and for her to continue to work on her weight loss and exercise.  - Comp Metabolic Panel; Future  - Lipid Profile; Future    2. Tinea unguium  I will refer patient to podiatry to see if she is a candidate for treatment and she will do a chemistry panel before that  - REFERRAL TO PODIATRY    3. History of PCOS  I advised patient that if she does have PCOS, we usually treat with metformin, birth control pills and possibly spironolactone so I would like to check her cholesterol and blood sugar.  - HEMOGLOBIN A1C; Future

## 2020-02-18 ENCOUNTER — HOSPITAL ENCOUNTER (OUTPATIENT)
Dept: LAB | Facility: MEDICAL CENTER | Age: 30
End: 2020-02-18
Attending: NURSE PRACTITIONER
Payer: COMMERCIAL

## 2020-02-18 DIAGNOSIS — Z00.00 ROUTINE GENERAL MEDICAL EXAMINATION AT A HEALTH CARE FACILITY: ICD-10-CM

## 2020-02-18 DIAGNOSIS — Z87.42 HISTORY OF PCOS: ICD-10-CM

## 2020-02-18 LAB
ALBUMIN SERPL BCP-MCNC: 4.1 G/DL (ref 3.2–4.9)
ALBUMIN/GLOB SERPL: 1.2 G/DL
ALP SERPL-CCNC: 77 U/L (ref 30–99)
ALT SERPL-CCNC: 23 U/L (ref 2–50)
ANION GAP SERPL CALC-SCNC: 9 MMOL/L (ref 0–11.9)
AST SERPL-CCNC: 16 U/L (ref 12–45)
BILIRUB SERPL-MCNC: 0.7 MG/DL (ref 0.1–1.5)
BUN SERPL-MCNC: 9 MG/DL (ref 8–22)
CALCIUM SERPL-MCNC: 9 MG/DL (ref 8.5–10.5)
CHLORIDE SERPL-SCNC: 106 MMOL/L (ref 96–112)
CHOLEST SERPL-MCNC: 172 MG/DL (ref 100–199)
CO2 SERPL-SCNC: 23 MMOL/L (ref 20–33)
CREAT SERPL-MCNC: 0.66 MG/DL (ref 0.5–1.4)
GLOBULIN SER CALC-MCNC: 3.3 G/DL (ref 1.9–3.5)
GLUCOSE SERPL-MCNC: 88 MG/DL (ref 65–99)
HDLC SERPL-MCNC: 27 MG/DL
LDLC SERPL CALC-MCNC: 83 MG/DL
POTASSIUM SERPL-SCNC: 3.7 MMOL/L (ref 3.6–5.5)
PROT SERPL-MCNC: 7.4 G/DL (ref 6–8.2)
SODIUM SERPL-SCNC: 138 MMOL/L (ref 135–145)
TRIGL SERPL-MCNC: 308 MG/DL (ref 0–149)

## 2020-02-18 PROCEDURE — 80053 COMPREHEN METABOLIC PANEL: CPT

## 2020-02-18 PROCEDURE — 36415 COLL VENOUS BLD VENIPUNCTURE: CPT

## 2020-02-18 PROCEDURE — 83036 HEMOGLOBIN GLYCOSYLATED A1C: CPT

## 2020-02-18 PROCEDURE — 80061 LIPID PANEL: CPT

## 2020-02-19 LAB
EST. AVERAGE GLUCOSE BLD GHB EST-MCNC: 114 MG/DL
HBA1C MFR BLD: 5.6 % (ref 0–5.6)

## 2020-09-17 ENCOUNTER — TELEMEDICINE (OUTPATIENT)
Dept: MEDICAL GROUP | Facility: MEDICAL CENTER | Age: 30
End: 2020-09-17
Payer: COMMERCIAL

## 2020-09-17 VITALS — BODY MASS INDEX: 45.99 KG/M2 | HEIGHT: 67 IN | WEIGHT: 293 LBS

## 2020-09-17 DIAGNOSIS — E28.2 PCOS (POLYCYSTIC OVARIAN SYNDROME): ICD-10-CM

## 2020-09-17 DIAGNOSIS — R74.01 TRANSAMINITIS: ICD-10-CM

## 2020-09-17 DIAGNOSIS — E78.1 HYPERTRIGLYCERIDEMIA: ICD-10-CM

## 2020-09-17 PROCEDURE — 99213 OFFICE O/P EST LOW 20 MIN: CPT | Mod: 95,CR | Performed by: NURSE PRACTITIONER

## 2020-09-17 ASSESSMENT — FIBROSIS 4 INDEX: FIB4 SCORE: 0.31

## 2020-09-17 NOTE — PROGRESS NOTES
Virtual Visit: Established Patient   This visit was conducted via Zoom  using secure and encrypted videoconferencing technology. The patient was in a private location in the state of [place].    The patient's identity was confirmed and verbal consent was obtained for this virtual visit.      CC: Patient requesting visit today to be referred for bariatric surgery for her obesity as well as follow-up on hypertriglyceridemia and PCOS.                                                                                                                                     HPI:   Flaquita presents today with the following.    1. Hypertriglyceridemia  Patient has history of elevated triglycerides, possibly secondary to her morbid obesity but she has not at this point needed medication.  She did have a pancreatitis at one time which could have been related to this.    2. Transaminitis  Previous history of transaminitis the patient states she rarely drinks alcohol anymore but may have fatty liver with obesity    3. PCOS (polycystic ovarian syndrome)  Patient reports she has been following with her gynecologist for PCO S but is not currently on medication such as metformin or Aldactone.  She also is not on birth control pills.  She is hoping this will improve her weight loss.    4. BMI 45.0-49.9, adult (HCC)  Patient has had trouble losing weight just through dieting over the years and would like to be seen by bariatric surgeon to discuss bariatric surgery.  She states she did make an appointment with  in 2 weeks but needs an actual referral.  She is motivated to losing weight and works now at [employer] where she is on her feet all day and she is finding it is very painful because of her obesity with a prolonged standing and walking.      Patient Active Problem List    Diagnosis Date Noted   • Pancreatitis 01/01/2015     Priority: High   • PCOS (polycystic ovarian syndrome) 09/17/2020   • BMI 45.0-49.9, adult (HCC)  "10/09/2017   • Hypertriglyceridemia 01/02/2015   • Cholelithiases 01/02/2015       No current outpatient medications on file.     No current facility-administered medications for this visit.          Allergies as of 09/17/2020   • (No Known Allergies)        ROS:  Denies, chest pain, Shortness of breath, Edema.  Positive for joint pain.    Ht 1.702 m (5' 7\")   Wt (!) 140.6 kg (310 lb)   BMI 48.55 kg/m²       Physical Exam:  Constitutional: Alert, no distress, well-groomed.  Skin: No rashes in visible areas.  Eye: Round. Conjunctiva clear, lNo icterus.   ENMT: Lips pink without lesions, good dentition, moist mucous membranes. Phonation normal.  Neck: No masses, no thyromegaly. Moves freely without pain.  CV: Pulse as reported by patient  Respiratory: Unlabored respiratory effort, no cough or audible wheeze  Psych: Alert and oriented x3, normal affect and mood.          Assessment and Plan.   30 y.o. female with the following issues.    1. Hypertriglyceridemia  Triglycerides have been elevated but she is not diabetic and has had previous pancreatitis.  This will improve with a low-carb diet and weight loss.    2. Transaminitis  Most recent levels were normal normal but previous levels were elevated.  She may have fatty liver with her obesity    3. PCOS (polycystic ovarian syndrome)  Patient states she has been followed for this through her gynecologist and does not have to follow-up for another 6 months.    4. BMI 45.0-49.9, adult (HCC)  Patient would like to have bariatric surgery because she is unable to lose significant weight just through dieting.  She does need a referral which I will place today and I explained this will help her with her blood pressure, blood sugar and cholesterol as well as possibly help with her transaminitis.  - REFERRAL TO BARIATRIC SURGERY    "

## 2020-11-09 ENCOUNTER — OFFICE VISIT (OUTPATIENT)
Dept: MEDICAL GROUP | Facility: MEDICAL CENTER | Age: 30
End: 2020-11-09
Payer: COMMERCIAL

## 2020-11-09 VITALS
DIASTOLIC BLOOD PRESSURE: 70 MMHG | TEMPERATURE: 97.9 F | HEART RATE: 79 BPM | BODY MASS INDEX: 45.99 KG/M2 | WEIGHT: 293 LBS | SYSTOLIC BLOOD PRESSURE: 128 MMHG | HEIGHT: 67 IN | OXYGEN SATURATION: 94 %

## 2020-11-09 DIAGNOSIS — Z23 NEED FOR TDAP VACCINATION: ICD-10-CM

## 2020-11-09 DIAGNOSIS — Z01.818 PRE-OPERATIVE CLEARANCE: ICD-10-CM

## 2020-11-09 DIAGNOSIS — E78.1 HYPERTRIGLYCERIDEMIA: ICD-10-CM

## 2020-11-09 DIAGNOSIS — M79.671 RIGHT FOOT PAIN: ICD-10-CM

## 2020-11-09 PROCEDURE — 99213 OFFICE O/P EST LOW 20 MIN: CPT | Mod: 25 | Performed by: NURSE PRACTITIONER

## 2020-11-09 PROCEDURE — 90715 TDAP VACCINE 7 YRS/> IM: CPT | Performed by: NURSE PRACTITIONER

## 2020-11-09 PROCEDURE — 90471 IMMUNIZATION ADMIN: CPT | Performed by: NURSE PRACTITIONER

## 2020-11-09 ASSESSMENT — FIBROSIS 4 INDEX: FIB4 SCORE: 0.31

## 2020-11-09 NOTE — LETTER
November 9, 2020        Flaquita Mittal  [address]  [facility] [address]        To whom it may concern:    Flaquita is low risk for surgery and has no history of heart or lung disease. She is cleared from this office for surgery assuming normal pro-op labs and imaging.    If you have any questions or concerns, please don't hesitate to call.        Sincerely,        ADLILA Urena.P.FILIBERTO.    Electronically Signed

## 2020-11-09 NOTE — PROGRESS NOTES
Subjective:      Flaquita Mittal is a 30 y.o. female who presents with Medical Clearance        CC: This is a pleasant 30-year-old female here today for preop clearance for gastric sleeve as well as complaints of right foot pain and need of vaccines.    HPI         1. Right foot pain  Patient reports she has to stand on her feet and walk up to 12 hours/day, a few days per week for her job and subsequently developed pain in her right heel usually at the end of her shift.  She has been treating with over-the-counter ibuprofen as needed.  She has not noticed swelling and there has been no trauma.    2. Hypertriglyceridemia  Patient has history of elevated triglycerides and pancreatitis and is obese    3. Pre-operative clearance  Patient is needing preoperative clearance for planned gastric sleeve.  She states she is going through the process is required by her insurance and hopes to do this next year.    4. Need for Tdap vaccination  Patient due for vaccine and declined hepatitis B and Pneumovax    5. BMI 45.0-49.9, adult (HCC)  Patient morbidly obese and has trouble losing weight so is hoping to have gastric sleeve in the near future  Past Medical History:   Diagnosis Date   • Ovarian cyst 02/08/2009    Left     Social History     Socioeconomic History   • Marital status: Single     Spouse name: Not on file   • Number of children: Not on file   • Years of education: Not on file   • Highest education level: Not on file   Occupational History   • Not on file   Social Needs   • Financial resource strain: Not on file   • Food insecurity     Worry: Not on file     Inability: Not on file   • Transportation needs     Medical: Not on file     Non-medical: Not on file   Tobacco Use   • Smoking status: Former Smoker     Packs/day: 0.10     Types: Cigarettes   • Smokeless tobacco: Never Used   • Tobacco comment: Quit 2017   Substance and Sexual Activity   • Alcohol use: No   • Drug use: No   • Sexual activity: Yes      "Partners: Male   Lifestyle   • Physical activity     Days per week: Not on file     Minutes per session: Not on file   • Stress: Not on file   Relationships   • Social connections     Talks on phone: Not on file     Gets together: Not on file     Attends Bahai service: Not on file     Active member of club or organization: Not on file     Attends meetings of clubs or organizations: Not on file     Relationship status: Not on file   • Intimate partner violence     Fear of current or ex partner: Not on file     Emotionally abused: Not on file     Physically abused: Not on file     Forced sexual activity: Not on file   Other Topics Concern   • Not on file   Social History Narrative   • Not on file     No current outpatient medications on file.     No current facility-administered medications for this visit.      Family History   Problem Relation Age of Onset   • Diabetes Mother    • Cancer Father         pancreatic         Review of Systems   Musculoskeletal: Positive for joint pain.   All other systems reviewed and are negative.         Objective:     /70 (BP Location: Right arm, Patient Position: Sitting, BP Cuff Size: Large adult)   Pulse 79   Temp 36.6 °C (97.9 °F) (Temporal)   Ht 1.702 m (5' 7\")   Wt (!) 141.3 kg (311 lb 8.2 oz)   SpO2 94%   BMI 48.79 kg/m²      Physical Exam  Vitals signs and nursing note reviewed.   Constitutional:       General: She is not in acute distress.     Appearance: She is well-developed. She is not diaphoretic.   HENT:      Head: Normocephalic and atraumatic.      Right Ear: External ear normal.      Left Ear: External ear normal.      Nose: Nose normal.   Eyes:      General:         Right eye: No discharge.         Left eye: No discharge.   Neck:      Musculoskeletal: Normal range of motion and neck supple.      Thyroid: No thyromegaly.   Cardiovascular:      Rate and Rhythm: Normal rate and regular rhythm.      Heart sounds: Normal heart sounds. No murmur. No friction " rub. No gallop.    Pulmonary:      Effort: Pulmonary effort is normal.      Breath sounds: Normal breath sounds. No wheezing or rales.   Musculoskeletal:         General: No tenderness.   Skin:     General: Skin is warm and dry.      Findings: No rash.   Neurological:      Mental Status: She is alert and oriented to person, place, and time.      Deep Tendon Reflexes: Reflexes normal.   Psychiatric:         Behavior: Behavior normal.         Thought Content: Thought content normal.         Judgment: Judgment normal.                 Assessment/Plan:        1. Right foot pain  This is most likely related to patient's prolonged standing and walking for a few days in a row and I recommended rest and hopefully with her upcoming surgery, she will be off of work for a few weeks anyway.  I advised good shoes and elevation of the feet.  If it still does not improve I recommended a podiatry referral    2. Hypertriglyceridemia  Hopefully this will improve with weight loss and surgery.    3. Pre-operative clearance  Patient given clearance based on today's exam and history and assuming good lab work and imaging prior to surgery    4. Need for Tdap vaccination  I have placed the below orders and discussed them with an approved delegating provider. The MA is performing the below orders under the direction of Dr. Saturnino MD.    - Tdap =>6yo IM    5. BMI 45.0-49.9, adult (HCC)  Patient is going to a bariatric surgeon and has gastric sleeve planned in the future

## 2020-11-18 ENCOUNTER — NON-PROVIDER VISIT (OUTPATIENT)
Dept: HEALTH INFORMATION MANAGEMENT | Facility: MEDICAL CENTER | Age: 30
End: 2020-11-18
Payer: COMMERCIAL

## 2020-11-18 VITALS — HEIGHT: 66 IN | BODY MASS INDEX: 47.09 KG/M2 | WEIGHT: 293 LBS

## 2020-11-18 DIAGNOSIS — E66.01 MORBID (SEVERE) OBESITY DUE TO EXCESS CALORIES (HCC): ICD-10-CM

## 2020-11-18 PROCEDURE — 97802 MEDICAL NUTRITION INDIV IN: CPT | Performed by: DIETITIAN, REGISTERED

## 2020-11-18 ASSESSMENT — FIBROSIS 4 INDEX: FIB4 SCORE: 0.31

## 2020-11-19 NOTE — PROGRESS NOTES
"11/18/2020    CANDE Urena  30 y.o.   Time in/out: 4:224-5:20 pm     Anthropometrics/Objective  Vitals:    11/18/20 1736   Weight: (!) 144.6 kg (318 lb 11.2 oz)   Height: 1.676 m (5' 6\")       Body mass index is 51.44 kg/m².    Stated Goal Weight: <200lb  Estimated caloric needs: 2142 kcals/day ([place])   See comprehensive patient history form for further information     Subjective:  -Here for 1 time bariatric surgery nutrition evaluation  -Has always been overweight  -Unsure why she is not able to lose   -Walking significant amount during the day 9-11 miles at work   -Finds portion control and reading labels the challenging part about nutrition and meal planning   -No consumption of sweetened beverages     Nutrition Diagnosis (PES Statement)  · Obesity related to excessive energy intake and inadequate energy energy expenditure as evidenced by BMI of 51.4    Client history:  Condition(s) associated with a diagnosis or treatment (specify) obesity     Biochemical data, medical test and procedures  Lab Results   Component Value Date/Time    HBA1C 5.6 02/18/2020 11:45 AM   @  Lab Results   Component Value Date/Time    POCGLUCOSE 99 02/13/2006 05:09 PM     Lab Results   Component Value Date/Time    CHOLSTRLTOT 172 02/18/2020 11:45 AM    LDL 83 02/18/2020 11:45 AM    HDL 27 (A) 02/18/2020 11:45 AM    TRIGLYCERIDE 308 (H) 02/18/2020 11:45 AM         Nutrition Intervention    Meal and Snack  Recommend a general/healthful diet    Comprehensive Nutrition education Instruction or training leading to in-depth nutrition related knowledge about:  · Basic physiology of metabolism  · Pros/cons of bariatric surgery - this is a tool and not a “quick fix”   · Dumping syndrome:  what it is and how to avoid it (if applicable)   · Importance of avoiding foods high in fat/sugar  · Outline of the progression of the diet/stages of diet after surgery  · The importance of eating meals/snacks at appropriate times  · Chewing " food well  · Adequate fluid intake - sip fluids throughout the day and avoid with meals  · Lifestyle modifications - the importance of healthful food choices and exercise  · Basic concept of emotional vs. physical hunger  · Using the Plate Planner (handout given)   · Tips for eating out (handout given)      Monitoring & Evaluation Plan    Behavioral-Environmental:  Behavior: Practice portion control and making healthier choices lower in sugar and fat when eating out   Physical activity: continue as tolerated    Food / Nutrient Intake:  Food Intake:  1. Eat 3 meals and 3 small snacks daily   2. Slow down when eating.  3. Chew your food well.  4. Use smaller plates/bowls/utensils to help eat smaller portions.  5. Do not eat in front of the TV nor read while eating; focus on your body and the food.  6. Decrease your intake of high fat/high sugar foods.  7. Eat more fruits and vegetables.  8. Use sugar substitutes like Stevia or Splenda.  9. Eat protein foods first   10. Start taking a Multivitamin with minerals daily      Fluid Intake:  1. Practice not drinking fluids 30 - 60 minutes before and after meals.  2. Do not drink from a straw.  3. Stop drinking sugar-sweetened beverages.  4. Start weaning off of caffeine and carbonated beverages.        5. Practice sipping liquids; not gulping     Physical Signs / Symptoms:  Weight change to goal     Assessment Notes:  Pt here for 1 time nutrition evaluation. The pt's eating routine is scheduled during the week while at work, but days off vary with higher intake of restaurant/fast food. Activity level is high while at work walking daily 5-6 days a week. Reviewed above nutrition guidelines, diet progression post-op, and tips for long-term success to begin practicing prior to surgery. The pt appeared to understand all concepts discussed today as evidenced by appropriate comments/questions and her expected level of compliance is good. We also reviewed importance of protein and  including at every meal and snack, healthy tips for eating out, and provided pt with handout on reading the food label per her request. The pt was encouraged to follow-up as need to assist in behavior and lifestlye change.     Zohra Monteiro RD,LD  [facility]  [phone]

## 2020-12-10 ENCOUNTER — HOSPITAL ENCOUNTER (OUTPATIENT)
Dept: RADIOLOGY | Facility: MEDICAL CENTER | Age: 30
End: 2020-12-10
Attending: SURGERY
Payer: COMMERCIAL

## 2020-12-10 DIAGNOSIS — K21.9 GASTROESOPHAGEAL REFLUX DISEASE, UNSPECIFIED WHETHER ESOPHAGITIS PRESENT: ICD-10-CM

## 2020-12-10 PROCEDURE — 74240 X-RAY XM UPR GI TRC 1CNTRST: CPT

## 2021-01-06 ENCOUNTER — TELEMEDICINE (OUTPATIENT)
Dept: MEDICAL GROUP | Facility: MEDICAL CENTER | Age: 31
End: 2021-01-06
Payer: COMMERCIAL

## 2021-01-06 VITALS — HEIGHT: 66 IN | RESPIRATION RATE: 16 BRPM | BODY MASS INDEX: 47.09 KG/M2 | WEIGHT: 293 LBS

## 2021-01-06 DIAGNOSIS — E78.1 HYPERTRIGLYCERIDEMIA: ICD-10-CM

## 2021-01-06 DIAGNOSIS — M79.671 RIGHT FOOT PAIN: ICD-10-CM

## 2021-01-06 PROCEDURE — 99213 OFFICE O/P EST LOW 20 MIN: CPT | Mod: 95,CR | Performed by: NURSE PRACTITIONER

## 2021-01-06 ASSESSMENT — PATIENT HEALTH QUESTIONNAIRE - PHQ9: CLINICAL INTERPRETATION OF PHQ2 SCORE: 0

## 2021-01-06 ASSESSMENT — FIBROSIS 4 INDEX: FIB4 SCORE: 0.31

## 2021-01-06 NOTE — LETTER
January 6, 2021        Flaquita Mittal  [address]  [facility] [address]        To whom it may concern:    Flaquita has morbid obesity for which we have been discussing weight loss and possible bariatric surgery since her visit on 9/17/20 when she was 310 LB.   We again discussed need for weight loss at visit on 11/9/20 and she was 311 LB at that visit. She saw the dietician on 11/18/20 and discussed in depth ways to lose weight. She again was seen on 1/6/21 for telemedicine visit with me and weight reported as 318 Lb. Patient advised to continue plan for gastric sleeve.    Previous attempts at weight loss on her own were unsuccessful without intervention.    If you have any questions or concerns, please don't hesitate to call.        Sincerely,        MICHAEL UrenaPANNE.    Electronically Signed

## 2021-01-06 NOTE — PROGRESS NOTES
Virtual Visit: Established Patient   This visit was conducted via Zoom  using secure and encrypted videoconferencing technology. The patient was in a private location in the state of [place].    The patient's identity was confirmed and verbal consent was obtained for this virtual visit.      CC: Patient requesting telemedicine visit from home today for follow-up on right foot pain, hypertriglyceridemia and morbid obesity.                                                                                                                                      HPI:   Flaquita presents today with the following.    1. Right foot pain  Patient reported some right heel pain on her last visit 2 months ago and she states it has not improved.  I had advised at the time good fitting shoes and over-the-counter medicines which she states she did but it has not improved and is mostly bothers her when she is walking.  She would now be willing to see a podiatrist    2. Hypertriglyceridemia  Patient has had elevated triglycerides as well as history of pancreatitis possibly secondary to her morbid obesity.  Her last triglycerides were 308 and her HDL was low at 27    3. BMI 50.0-59.9, adult (HCC)  Patient has been unsuccessful on losing significant weight on her own and in September at a visit she was referred to bariatric surgery.  She has met with the bariatric surgeon and was given medical clearance to go forward.  She was seen in November and then today as well for a telemedicine visit.  She also has been to the dietitian on November 18.  She has gained some weight over the last few weeks mostly due to grief with the loss of relative and being inside for COVID-19.  She is very motivated to lose weight and will be following a liquid diet as recommended by the dietitian in the near future.  She has been having some issues with getting this cleared through her insurance.      Patient Active Problem List    Diagnosis Date Noted   •  "Pancreatitis 01/01/2015     Priority: High   • PCOS (polycystic ovarian syndrome) 09/17/2020   • BMI 50.0-59.9, adult (Former[facility]) 10/09/2017   • Hypertriglyceridemia 01/02/2015   • Cholelithiases 01/02/2015       No current outpatient medications on file.     No current facility-administered medications for this visit.          Allergies as of 01/06/2021   • (No Known Allergies)        ROS:  Denies, chest pain, Shortness of breath, Edema.  Positive for mild depression    Resp 16   Ht 1.676 m (5' 6\")   Wt (!) 144.2 kg (318 lb)   BMI 51.33 kg/m²       Physical Exam:  Constitutional: Alert, no distress, well-groomed.  Skin: No rashes in visible areas.  Eye: Round. Conjunctiva clear, lNo icterus.   ENMT: Lips pink without lesions, good dentition, moist mucous membranes. Phonation normal.  Neck: No masses, no thyromegaly. Moves freely without pain.  CV: Pulse as reported by patient  Respiratory: Unlabored respiratory effort, no cough or audible wheeze  Psych: Alert and oriented x3, normal affect and mood.          Assessment and Plan.   30 y.o. female with the following issues.    1. Right foot pain  This did not clear on its own so I will refer her to podiatry to see if any further treatment is needed.  In the meantime she will use over-the-counter NSAIDs and use cushioning for her feet  - REFERRAL TO PODIATRY    2. Hypertriglyceridemia  This is most likely related to her morbid obesity and diet and hopefully will improve as she loses weight.    3. BMI 50.0-59.9, adult (Former[facility])  I provided patient with a letter per her request for her insurance company stating her visits and our discussion about need for weight loss and dieting.  She will continue to follow with the dietitian and hopefully with the change in her diet and as she gets over the grief of the loss of her relative, she will start to lose weight and go forward with bariatric surgery.  She is a high risk for health problems in the future because of her obesity and " already shows hypertriglyceridemia and history of pancreatitis.  She also has history of PCOS.

## 2021-02-01 ENCOUNTER — NON-PROVIDER VISIT (OUTPATIENT)
Dept: URGENT CARE | Facility: PHYSICIAN GROUP | Age: 31
End: 2021-02-01
Payer: COMMERCIAL

## 2021-02-01 DIAGNOSIS — Z11.1 PPD SCREENING TEST: ICD-10-CM

## 2021-02-01 PROCEDURE — 86580 TB INTRADERMAL TEST: CPT | Performed by: PHYSICIAN ASSISTANT

## 2021-02-02 NOTE — NON-PROVIDER
Flaquita Mittal is a 30 y.o. female here for a non-provider visit for PPD placement -- Step 1 of 1    Reason for PPD:  group home requirement    1. TB evaluation questionnaire completed by patient? Yes      -  If any answers marked yes did you contact a provider prior to placing? Not Indicated  2.  Patient notified to return to clinic for reading on: 02/03//21 after 17:41 or 02/04/21 before 17:41  3.  PPD Placement documentation completed on TB evaluation questionnaire? Yes  4.  Location of TB evaluation questionnaire filed:  files

## 2021-02-04 ENCOUNTER — NON-PROVIDER VISIT (OUTPATIENT)
Dept: URGENT CARE | Facility: PHYSICIAN GROUP | Age: 31
End: 2021-02-04

## 2021-02-04 LAB — TB WHEAL 3D P 5 TU DIAM: NORMAL MM

## 2021-02-05 NOTE — NON-PROVIDER
Flaquita Mittal is a 30 y.o. female here for a non-provider visit for PPD reading -- Step 1 of 1.      1.  Resulted in Epic under enter/edit results? Yes   2.  TB evaluation questionnaire scanned into chart and original given to patient?Yes      3. Was induration greater than 0 mm? no    If Step 1 of 2, when is patient returning for second step (delete if N/A): n/a    Routed to PCP? No

## 2021-07-20 PROCEDURE — RXMED WILLOW AMBULATORY MEDICATION CHARGE: Performed by: SURGERY

## 2021-07-21 ENCOUNTER — PHARMACY VISIT (OUTPATIENT)
Dept: PHARMACY | Facility: MEDICAL CENTER | Age: 31
End: 2021-07-21
Payer: COMMERCIAL

## 2021-11-20 ENCOUNTER — OFFICE VISIT (OUTPATIENT)
Dept: URGENT CARE | Facility: PHYSICIAN GROUP | Age: 31
End: 2021-11-20
Payer: COMMERCIAL

## 2021-11-20 VITALS
WEIGHT: 246 LBS | HEART RATE: 65 BPM | OXYGEN SATURATION: 96 % | HEIGHT: 66 IN | BODY MASS INDEX: 39.53 KG/M2 | TEMPERATURE: 97.4 F | DIASTOLIC BLOOD PRESSURE: 56 MMHG | SYSTOLIC BLOOD PRESSURE: 100 MMHG

## 2021-11-20 DIAGNOSIS — R21 SKIN RASH: ICD-10-CM

## 2021-11-20 DIAGNOSIS — L73.9 FOLLICULITIS: ICD-10-CM

## 2021-11-20 PROCEDURE — 99213 OFFICE O/P EST LOW 20 MIN: CPT | Performed by: NURSE PRACTITIONER

## 2021-11-20 RX ORDER — CEPHALEXIN 500 MG/1
500 CAPSULE ORAL 3 TIMES DAILY
Qty: 21 CAPSULE | Refills: 0 | Status: SHIPPED | OUTPATIENT
Start: 2021-11-20 | End: 2021-11-27

## 2021-11-20 NOTE — PROGRESS NOTES
Subjective     Flaquita Mittal is a 31 y.o. female who presents with Bump (Skin right hand and face yesterday)    Past Medical History:   Diagnosis Date   • Ovarian cyst 2009    Left     Social History     Socioeconomic History   • Marital status:      Spouse name: Not on file   • Number of children: Not on file   • Years of education: Not on file   • Highest education level: Not on file   Occupational History   • Not on file   Tobacco Use   • Smoking status: Former Smoker     Packs/day: 0.10     Types: Cigarettes     Quit date: 2016     Years since quittin.0   • Smokeless tobacco: Never Used   • Tobacco comment: Quit 2017   Vaping Use   • Vaping Use: Never used   Substance and Sexual Activity   • Alcohol use: No   • Drug use: No   • Sexual activity: Yes     Partners: Male   Other Topics Concern   • Not on file   Social History Narrative   • Not on file     Social Determinants of Health     Financial Resource Strain:    • Difficulty of Paying Living Expenses: Not on file   Food Insecurity:    • Worried About Running Out of Food in the Last Year: Not on file   • Ran Out of Food in the Last Year: Not on file   Transportation Needs:    • Lack of Transportation (Medical): Not on file   • Lack of Transportation (Non-Medical): Not on file   Physical Activity:    • Days of Exercise per Week: Not on file   • Minutes of Exercise per Session: Not on file   Stress:    • Feeling of Stress : Not on file   Social Connections:    • Frequency of Communication with Friends and Family: Not on file   • Frequency of Social Gatherings with Friends and Family: Not on file   • Attends [employer]ism Services: Not on file   • Active Member of Clubs or Organizations: Not on file   • Attends Club or Organization Meetings: Not on file   • Marital Status: Not on file   Intimate Partner Violence:    • Fear of Current or Ex-Partner: Not on file   • Emotionally Abused: Not on file   • Physically Abused: Not on file  "  • Sexually Abused: Not on file   Housing Stability:    • Unable to Pay for Housing in the Last Year: Not on file   • Number of Places Lived in the Last Year: Not on file   • Unstable Housing in the Last Year: Not on file     Family History   Problem Relation Age of Onset   • Diabetes Mother    • Cancer Father         pancreatic       Allergies: Patient has no known allergies.    Patient is a 31-year-old female who presents with complaint of new onset rash over the last 24 hours.  States yesterday at work she noted a slight irritation on her finger that was somewhat painful.  This morning, she woke up with painful lesions to the palmar surface of her hands.  Also around her mouth, forehead, scalp, and upper back.  No lesions on lower extremities or the feet.  No pain or tenderness or lesions in the mouth.  No fever, aches, or chills.          Other  This is a new problem. The current episode started yesterday. The problem occurs constantly. The problem has been unchanged. Associated symptoms include a rash. Nothing aggravates the symptoms. She has tried nothing for the symptoms. The treatment provided no relief.       Review of Systems   Skin: Positive for rash.   All other systems reviewed and are negative.             Objective     /56 (BP Location: Left arm, Patient Position: Sitting, BP Cuff Size: Large adult)   Pulse 65   Temp 36.3 °C (97.4 °F) (Temporal)   Ht 1.676 m (5' 6\")   Wt 112 kg (246 lb)   SpO2 96%   BMI 39.71 kg/m²      Physical Exam  Vitals reviewed.   Constitutional:       Appearance: Normal appearance.   HENT:      Head: Normocephalic and atraumatic.   Neurological:      Mental Status: She is alert and oriented to person, place, and time.   Psychiatric:         Mood and Affect: Mood normal.         Behavior: Behavior normal.       Maculopapular rash noted over the palmar surface of both hands.  Patient also has macular papular rash noted over the forehead.  She has a pustular rash " noted around the mouth with some crusting under the right nostril.  Rash extends up onto the forehead.  Rash also noted to the upper back also somewhat pustular and pruritic.                      Assessment & Plan   Viral rash such as hand-foot-and-mouth versus bacterial folliculitis    Keflex  bactroban topical  Zyrtec PRN itching  Follow up for persistent or worsening of symtoms      There are no diagnoses linked to this encounter.

## 2022-02-04 ENCOUNTER — APPOINTMENT (OUTPATIENT)
Dept: MEDICAL GROUP | Facility: MEDICAL CENTER | Age: 32
End: 2022-02-04
Payer: COMMERCIAL

## 2022-02-08 ENCOUNTER — OFFICE VISIT (OUTPATIENT)
Dept: MEDICAL GROUP | Facility: MEDICAL CENTER | Age: 32
End: 2022-02-08
Payer: COMMERCIAL

## 2022-02-08 VITALS
OXYGEN SATURATION: 96 % | HEIGHT: 66 IN | DIASTOLIC BLOOD PRESSURE: 70 MMHG | WEIGHT: 235 LBS | SYSTOLIC BLOOD PRESSURE: 106 MMHG | HEART RATE: 81 BPM | BODY MASS INDEX: 37.77 KG/M2 | TEMPERATURE: 98.6 F

## 2022-02-08 DIAGNOSIS — E78.1 HYPERTRIGLYCERIDEMIA: ICD-10-CM

## 2022-02-08 DIAGNOSIS — K21.9 GASTROESOPHAGEAL REFLUX DISEASE WITHOUT ESOPHAGITIS: ICD-10-CM

## 2022-02-08 DIAGNOSIS — M79.604 RIGHT LEG PAIN: ICD-10-CM

## 2022-02-08 DIAGNOSIS — L65.9 HAIR LOSS: ICD-10-CM

## 2022-02-08 DIAGNOSIS — Z23 NEED FOR VACCINATION: ICD-10-CM

## 2022-02-08 DIAGNOSIS — Z98.84 S/P BARIATRIC SURGERY: ICD-10-CM

## 2022-02-08 PROCEDURE — 99214 OFFICE O/P EST MOD 30 MIN: CPT | Mod: 25 | Performed by: FAMILY MEDICINE

## 2022-02-08 PROCEDURE — 90471 IMMUNIZATION ADMIN: CPT | Performed by: FAMILY MEDICINE

## 2022-02-08 PROCEDURE — 90686 IIV4 VACC NO PRSV 0.5 ML IM: CPT | Performed by: FAMILY MEDICINE

## 2022-02-08 RX ORDER — OMEPRAZOLE 40 MG/1
CAPSULE, DELAYED RELEASE ORAL
COMMUNITY
Start: 2021-12-14 | End: 2022-10-18

## 2022-02-08 ASSESSMENT — PATIENT HEALTH QUESTIONNAIRE - PHQ9: CLINICAL INTERPRETATION OF PHQ2 SCORE: 0

## 2022-02-08 NOTE — PROGRESS NOTES
Subjective:     CC:  Diagnoses of Hair loss, Gastroesophageal reflux disease without esophagitis, Right leg pain, S/P bariatric surgery, Hypertriglyceridemia, Body mass index (BMI) 37.0-37.9, adult, and Need for vaccination were pertinent to this visit.    HISTORY OF THE PRESENT ILLNESS: Patient is a 31 y.o. female. This pleasant patient is here today to establish care and discuss hair loss, blood work. Her prior PCP was Dr. Comer.    Problem   Hair Loss    Has had clumpy hair loss, with brushing  Notices thinning of hair  Started about 2-3 months ago  Thinks it may be related to the weight loss and surgery.     Gastroesophageal Reflux Disease Without Esophagitis    Patient used to have very bad reflux, this is improved greatly with weight loss.  She uses Prilosec as needed a few times a week.     Right Leg Pain    Chronic  Feels tender along heel and back of lower leg  Worse as walking further  Rest helps  She had been getting cortisone shots in her heel but stopped this as she did not think it was helping     S/P Bariatric Surgery    Patient is status post bariatric surgery in July 2021  She has lost 100 pounds over  She is followed by the bariatric surgery program     Body Mass Index (Bmi) 37.0-37.9, Adult    Patient has lost 100 pounds in the last 6 months.  Weight is 235 107 kg with a BMI of 37.9     Hypertriglyceridemia    Component Ref Range & Units 1 yr ago 7 yr ago   Cholesterol,Tot 100 - 199 mg/dL 172  226 High     Triglycerides 0 - 149 mg/dL 308 High   205 High     HDL >=40 mg/dL 27 Abnormal   31 Abnormal     LDL <100 mg/dL 83  154 High            Cholelithiases (Resolved)   Pancreatitis (Resolved)       Current Outpatient Medications Ordered in Epic   Medication Sig Dispense Refill   • omeprazole (PRILOSEC) 40 MG delayed-release capsule TAKE 1 CAPSULE BY MOUTH EVERY DAY BEFORE A MEAL (Patient not taking: Reported on 2/8/2022)       No current Psychiatric-ordered facility-administered medications on file.  "      Health Maintenance: Completed, patient advised to get COVID booster at pharmacy    ROS:   ROS see HPI      Objective:       Exam: /70   Pulse 81   Temp 37 °C (98.6 °F) (Temporal)   Ht 1.676 m (5' 6\")   Wt 107 kg (235 lb)   SpO2 96%  Body mass index is 37.93 kg/m².    Physical Exam  Vitals reviewed.   Constitutional:       General: She is not in acute distress.     Appearance: Normal appearance. She is obese.   HENT:      Head: Normocephalic and atraumatic.      Comments: Noted hair thinning no overt bald patches  Cardiovascular:      Rate and Rhythm: Normal rate and regular rhythm.      Heart sounds: Normal heart sounds.   Pulmonary:      Effort: Pulmonary effort is normal.      Breath sounds: Normal breath sounds.   Skin:     General: Skin is dry.      Findings: Rash present.   Neurological:      Mental Status: She is alert. Mental status is at baseline.   Psychiatric:         Mood and Affect: Mood normal.         Behavior: Behavior normal.           Assessment & Plan:   31 y.o. female with the following -    Problem List Items Addressed This Visit     Hypertriglyceridemia     Patient now status post bariatric surgery, will get updated lipid panel.         Relevant Orders    Comp Metabolic Panel    Lipid Profile    Body mass index (BMI) 37.0-37.9, adult     Excellent job on weight loss, continue to follow with bariatric surgery program         Relevant Orders    Comp Metabolic Panel    Lipid Profile    Hair loss     We will check a TSH  Patient would like a referral to dermatology  Discussed trying pumpkin seed oil           Relevant Orders    TSH    Referral to Dermatology    Gastroesophageal reflux disease without esophagitis     Continue with Prilosec as needed we will continue to monitor         Relevant Medications    omeprazole (PRILOSEC) 40 MG delayed-release capsule    Other Relevant Orders    CBC WITHOUT DIFFERENTIAL    Right leg pain     Was not able to fully assess on this visit, with " patient that it is probably related to weight.  Will monitor to see if it improves with continued weight loss.         S/P bariatric surgery     I am going to check some extra lab work given possibility that she is not absorbing well         Relevant Orders    CBC WITHOUT DIFFERENTIAL    VITAMIN B12    IRON/TOTAL IRON BIND    FOLATE      Other Visit Diagnoses     Need for vaccination        Relevant Orders    Influenza Vaccine Quad Injection (PF) (Completed)            Return in about 9 months (around 11/8/2022).    Please note that this dictation was created using voice recognition software. I have made every reasonable attempt to correct obvious errors, but I expect that there are errors of grammar and possibly content that I did not discover before finalizing the note.

## 2022-02-08 NOTE — ASSESSMENT & PLAN NOTE
Was not able to fully assess on this visit, with patient that it is probably related to weight.  Will monitor to see if it improves with continued weight loss.

## 2022-02-08 NOTE — ASSESSMENT & PLAN NOTE
We will check a TSH  Patient would like a referral to dermatology  Discussed trying pumpkin seed oil

## 2022-10-18 ENCOUNTER — OFFICE VISIT (OUTPATIENT)
Dept: MEDICAL GROUP | Facility: MEDICAL CENTER | Age: 32
End: 2022-10-18
Payer: COMMERCIAL

## 2022-10-18 VITALS
SYSTOLIC BLOOD PRESSURE: 108 MMHG | HEART RATE: 77 BPM | TEMPERATURE: 98.7 F | DIASTOLIC BLOOD PRESSURE: 72 MMHG | WEIGHT: 246.8 LBS | OXYGEN SATURATION: 99 % | HEIGHT: 66 IN | BODY MASS INDEX: 39.66 KG/M2

## 2022-10-18 DIAGNOSIS — Z23 NEED FOR VACCINATION: ICD-10-CM

## 2022-10-18 DIAGNOSIS — E78.1 HYPERTRIGLYCERIDEMIA: ICD-10-CM

## 2022-10-18 DIAGNOSIS — E66.9 OBESITY (BMI 35.0-39.9 WITHOUT COMORBIDITY): ICD-10-CM

## 2022-10-18 DIAGNOSIS — K21.9 GASTROESOPHAGEAL REFLUX DISEASE WITHOUT ESOPHAGITIS: ICD-10-CM

## 2022-10-18 DIAGNOSIS — N83.202 CYST OF LEFT OVARY: ICD-10-CM

## 2022-10-18 DIAGNOSIS — Z98.84 S/P BARIATRIC SURGERY: ICD-10-CM

## 2022-10-18 DIAGNOSIS — L65.9 HAIR LOSS: ICD-10-CM

## 2022-10-18 PROCEDURE — 90746 HEPB VACCINE 3 DOSE ADULT IM: CPT | Performed by: FAMILY MEDICINE

## 2022-10-18 PROCEDURE — 90686 IIV4 VACC NO PRSV 0.5 ML IM: CPT | Performed by: FAMILY MEDICINE

## 2022-10-18 PROCEDURE — 99214 OFFICE O/P EST MOD 30 MIN: CPT | Mod: 25 | Performed by: FAMILY MEDICINE

## 2022-10-18 PROCEDURE — 90472 IMMUNIZATION ADMIN EACH ADD: CPT | Performed by: FAMILY MEDICINE

## 2022-10-18 PROCEDURE — 90471 IMMUNIZATION ADMIN: CPT | Performed by: FAMILY MEDICINE

## 2022-10-18 RX ORDER — FINASTERIDE 1 MG/1
1 TABLET, FILM COATED ORAL
COMMUNITY
Start: 2022-08-19 | End: 2022-11-16

## 2022-10-18 RX ORDER — PANTOPRAZOLE SODIUM 20 MG/1
20 TABLET, DELAYED RELEASE ORAL DAILY
Qty: 30 TABLET | Refills: 1 | Status: SHIPPED | OUTPATIENT
Start: 2022-10-18 | End: 2022-11-11

## 2022-10-18 RX ORDER — TRAMADOL HYDROCHLORIDE 50 MG/1
50 TABLET ORAL EVERY 8 HOURS PRN
Qty: 21 TABLET | Refills: 0 | Status: SHIPPED | OUTPATIENT
Start: 2022-10-18 | End: 2022-10-25

## 2022-10-18 ASSESSMENT — ENCOUNTER SYMPTOMS
WEIGHT LOSS: 0
SHORTNESS OF BREATH: 0
HEARTBURN: 1
BACK PAIN: 0

## 2022-10-18 NOTE — ASSESSMENT & PLAN NOTE
Since has been some time since she has had any kind antiacid we will check for H. Pylori  Certain about increased ibuprofen use so we will give supplemental pain relief with tramadol while dealing with ovarian cyst

## 2022-10-18 NOTE — PROGRESS NOTES
"Subjective:     CC: \"ovarian cyst\"    HPI:   Flaquita presents today with:    Has been on finasteride for hair loss  Seems to be helping, feels hair is eprez  Managed by dermatology    Recently had to go to ER for abdominal pain on 10/11 (7 days ago)  US showed Ovarian cyst  Sees Dr. [facility]gen [facility]  Has lost right ovary due to cyst  Has some morning discomfort but has improved compared to last week  Tylenol and Advil is working  Pain sometimes wakes her up at night sometimes    Patient is taking prenatal vitamin  Tells me that she gets acid reflux.  4 times a week  Has been sometime since she has been on omeprazole but did use it regularly after her bariatric surgery      Problem   Hair Loss   Gastroesophageal Reflux Disease Without Esophagitis   S/P Bariatric Surgery    Patient is status post bariatric surgery in July 2021  She has lost 100 pounds over  She is followed by the bariatric surgery program     Obesity (Bmi 35.0-39.9 Without Comorbidity)    Patient has lost 100 pounds in the last 6 months.  Weight is 235 107 kg with a BMI of 37.9         Current Outpatient Medications Ordered in Epic   Medication Sig Dispense Refill    finasteride (PROPECIA) 1 MG tablet Take 1 mg by mouth every day.      traMADol (ULTRAM) 50 MG Tab Take 1 Tablet by mouth every 8 hours as needed for Severe Pain for up to 7 days. 21 Tablet 0    pantoprazole (PROTONIX) 20 MG tablet Take 1 Tablet by mouth every day. 30 Tablet 1     No current Epic-ordered facility-administered medications on file.       Health Maintenance: Influenza vaccine    ROS:  Review of Systems   Constitutional:  Negative for weight loss.   Respiratory:  Negative for shortness of breath.    Cardiovascular:  Negative for chest pain.   Gastrointestinal:  Positive for heartburn.   Genitourinary:  Negative for dysuria.   Musculoskeletal:  Negative for back pain.     Objective:     Exam:  /72   Pulse 77   Temp 37.1 °C (98.7 °F) (Temporal)   Ht 1.676 m " "(5' 6\")   Wt 112 kg (246 lb 12.8 oz)   SpO2 99%   BMI 39.83 kg/m²  Body mass index is 39.83 kg/m².    Physical Exam  Vitals reviewed.   HENT:      Head: Normocephalic and atraumatic.   Cardiovascular:      Rate and Rhythm: Normal rate and regular rhythm.      Heart sounds: Normal heart sounds.   Pulmonary:      Effort: Pulmonary effort is normal.      Breath sounds: Normal breath sounds.   Abdominal:      General: There is no distension.      Palpations: Abdomen is soft.      Tenderness: There is no abdominal tenderness. There is no guarding.   Skin:     General: Skin is warm and dry.   Neurological:      Mental Status: She is alert. Mental status is at baseline.   Psychiatric:         Mood and Affect: Mood normal.         Behavior: Behavior normal.         Assessment & Plan:     32 y.o. female with the following -     Problem List Items Addressed This Visit       Hypertriglyceridemia    Relevant Orders    Comp Metabolic Panel    Lipid Profile    Obesity (BMI 35.0-39.9 without comorbidity)    Relevant Orders    HEMOGLOBIN A1C    Comp Metabolic Panel    Lipid Profile    TSH WITH REFLEX TO FT4    Hair loss     Followed by dermatology  Improving with Propecia 1 mg continue with this therapy under dermatologist guidance.  Patient understands that she should stop this medication immediately if she were to become pregnant.         Gastroesophageal reflux disease without esophagitis     Since has been some time since she has had any kind antiacid we will check for H. Pylori  Certain about increased ibuprofen use so we will give supplemental pain relief with tramadol while dealing with ovarian cyst         Relevant Medications    pantoprazole (PROTONIX) 20 MG tablet    Other Relevant Orders    CBC WITH DIFFERENTIAL    Comp Metabolic Panel    H. PYLORI BREATH TEST    S/P bariatric surgery     We will get labs to screen for vitamin deficiencies         Relevant Orders    CBC WITH DIFFERENTIAL    VITAMIN B12    IRON/TOTAL " IRON BIND    FOLATE     Other Visit Diagnoses       Cyst of left ovary      Patient with enlarged ovarian cyst seen on imaging.  Patient to call gynecology office again to be seen soon.  Like to decrease patient's use of ibuprofen given her status post bariatric surgery.  PDMP reviewed and no concerns, opiate consent signed and discussed.  We will do short course of tramadol to help with as needed pain relief.    Relevant Medications    traMADol (ULTRAM) 50 MG Tab    Other Relevant Orders    Consent for Opiate Prescription    Need for vaccination        Relevant Orders    INFLUENZA VACCINE QUAD INJ (PF) (Completed)    Hepatitis B Vaccine Adult IM (Completed)              Return in about 4 weeks (around 11/15/2022) for Lab F/U, 2nd Hep B vaccine.    Please note that this dictation was created using voice recognition software. I have made every reasonable attempt to correct obvious errors, but I expect that there are errors of grammar and possibly content that I did not discover before finalizing the note.

## 2022-10-18 NOTE — ASSESSMENT & PLAN NOTE
Followed by dermatology  Improving with Propecia 1 mg continue with this therapy under dermatologist guidance.  Patient understands that she should stop this medication immediately if she were to become pregnant.

## 2022-10-27 ENCOUNTER — HOSPITAL ENCOUNTER (OUTPATIENT)
Dept: LAB | Facility: MEDICAL CENTER | Age: 32
End: 2022-10-27
Attending: FAMILY MEDICINE
Payer: COMMERCIAL

## 2022-10-27 DIAGNOSIS — Z98.84 S/P BARIATRIC SURGERY: ICD-10-CM

## 2022-10-27 DIAGNOSIS — E78.1 HYPERTRIGLYCERIDEMIA: ICD-10-CM

## 2022-10-27 DIAGNOSIS — E66.9 OBESITY (BMI 35.0-39.9 WITHOUT COMORBIDITY): ICD-10-CM

## 2022-10-27 DIAGNOSIS — K21.9 GASTROESOPHAGEAL REFLUX DISEASE WITHOUT ESOPHAGITIS: ICD-10-CM

## 2022-10-27 LAB
ALBUMIN SERPL BCP-MCNC: 4.2 G/DL (ref 3.2–4.9)
ALBUMIN/GLOB SERPL: 1.2 G/DL
ALP SERPL-CCNC: 95 U/L (ref 30–99)
ALT SERPL-CCNC: 13 U/L (ref 2–50)
ANION GAP SERPL CALC-SCNC: 12 MMOL/L (ref 7–16)
AST SERPL-CCNC: 19 U/L (ref 12–45)
BASOPHILS # BLD AUTO: 0.4 % (ref 0–1.8)
BASOPHILS # BLD: 0.03 K/UL (ref 0–0.12)
BILIRUB SERPL-MCNC: 1.5 MG/DL (ref 0.1–1.5)
BUN SERPL-MCNC: 9 MG/DL (ref 8–22)
CALCIUM SERPL-MCNC: 9.2 MG/DL (ref 8.5–10.5)
CHLORIDE SERPL-SCNC: 105 MMOL/L (ref 96–112)
CHOLEST SERPL-MCNC: 204 MG/DL (ref 100–199)
CO2 SERPL-SCNC: 21 MMOL/L (ref 20–33)
CREAT SERPL-MCNC: 0.5 MG/DL (ref 0.5–1.4)
EOSINOPHIL # BLD AUTO: 0.12 K/UL (ref 0–0.51)
EOSINOPHIL NFR BLD: 1.6 % (ref 0–6.9)
ERYTHROCYTE [DISTWIDTH] IN BLOOD BY AUTOMATED COUNT: 45.3 FL (ref 35.9–50)
EST. AVERAGE GLUCOSE BLD GHB EST-MCNC: 103 MG/DL
FOLATE SERPL-MCNC: 11.6 NG/ML
GFR SERPLBLD CREATININE-BSD FMLA CKD-EPI: 126 ML/MIN/1.73 M 2
GLOBULIN SER CALC-MCNC: 3.5 G/DL (ref 1.9–3.5)
GLUCOSE SERPL-MCNC: 76 MG/DL (ref 65–99)
HBA1C MFR BLD: 5.2 % (ref 4–5.6)
HCT VFR BLD AUTO: 42.2 % (ref 37–47)
HDLC SERPL-MCNC: 33 MG/DL
HGB BLD-MCNC: 13.8 G/DL (ref 12–16)
IMM GRANULOCYTES # BLD AUTO: 0.01 K/UL (ref 0–0.11)
IMM GRANULOCYTES NFR BLD AUTO: 0.1 % (ref 0–0.9)
IRON SATN MFR SERPL: 37 % (ref 15–55)
IRON SERPL-MCNC: 124 UG/DL (ref 40–170)
LDLC SERPL CALC-MCNC: 137 MG/DL
LYMPHOCYTES # BLD AUTO: 3.82 K/UL (ref 1–4.8)
LYMPHOCYTES NFR BLD: 51.9 % (ref 22–41)
MCH RBC QN AUTO: 29.1 PG (ref 27–33)
MCHC RBC AUTO-ENTMCNC: 32.7 G/DL (ref 33.6–35)
MCV RBC AUTO: 88.8 FL (ref 81.4–97.8)
MONOCYTES # BLD AUTO: 0.75 K/UL (ref 0–0.85)
MONOCYTES NFR BLD AUTO: 10.2 % (ref 0–13.4)
NEUTROPHILS # BLD AUTO: 2.63 K/UL (ref 2–7.15)
NEUTROPHILS NFR BLD: 35.8 % (ref 44–72)
NRBC # BLD AUTO: 0 K/UL
NRBC BLD-RTO: 0 /100 WBC
PLATELET # BLD AUTO: 288 K/UL (ref 164–446)
PMV BLD AUTO: 11.3 FL (ref 9–12.9)
POTASSIUM SERPL-SCNC: 4.6 MMOL/L (ref 3.6–5.5)
PROT SERPL-MCNC: 7.7 G/DL (ref 6–8.2)
RBC # BLD AUTO: 4.75 M/UL (ref 4.2–5.4)
SODIUM SERPL-SCNC: 138 MMOL/L (ref 135–145)
TIBC SERPL-MCNC: 339 UG/DL (ref 250–450)
TRIGL SERPL-MCNC: 171 MG/DL (ref 0–149)
TSH SERPL DL<=0.005 MIU/L-ACNC: 0.76 UIU/ML (ref 0.38–5.33)
UIBC SERPL-MCNC: 215 UG/DL (ref 110–370)
VIT B12 SERPL-MCNC: 444 PG/ML (ref 211–911)
WBC # BLD AUTO: 7.4 K/UL (ref 4.8–10.8)

## 2022-10-27 PROCEDURE — 83036 HEMOGLOBIN GLYCOSYLATED A1C: CPT

## 2022-10-27 PROCEDURE — 80061 LIPID PANEL: CPT

## 2022-10-27 PROCEDURE — 85025 COMPLETE CBC W/AUTO DIFF WBC: CPT

## 2022-10-27 PROCEDURE — 83540 ASSAY OF IRON: CPT

## 2022-10-27 PROCEDURE — 83550 IRON BINDING TEST: CPT

## 2022-10-27 PROCEDURE — 36415 COLL VENOUS BLD VENIPUNCTURE: CPT

## 2022-10-27 PROCEDURE — 80053 COMPREHEN METABOLIC PANEL: CPT

## 2022-10-27 PROCEDURE — 82607 VITAMIN B-12: CPT

## 2022-10-27 PROCEDURE — 82746 ASSAY OF FOLIC ACID SERUM: CPT

## 2022-10-27 PROCEDURE — 84443 ASSAY THYROID STIM HORMONE: CPT

## 2022-11-11 DIAGNOSIS — K21.9 GASTROESOPHAGEAL REFLUX DISEASE WITHOUT ESOPHAGITIS: ICD-10-CM

## 2022-11-11 RX ORDER — PANTOPRAZOLE SODIUM 20 MG/1
20 TABLET, DELAYED RELEASE ORAL DAILY
Qty: 30 TABLET | Refills: 1 | Status: SHIPPED | OUTPATIENT
Start: 2022-11-11 | End: 2022-11-28

## 2022-11-16 ENCOUNTER — OFFICE VISIT (OUTPATIENT)
Dept: MEDICAL GROUP | Facility: MEDICAL CENTER | Age: 32
End: 2022-11-16
Payer: COMMERCIAL

## 2022-11-16 VITALS
HEART RATE: 61 BPM | BODY MASS INDEX: 40.5 KG/M2 | OXYGEN SATURATION: 98 % | TEMPERATURE: 98.3 F | WEIGHT: 252 LBS | SYSTOLIC BLOOD PRESSURE: 110 MMHG | DIASTOLIC BLOOD PRESSURE: 82 MMHG | HEIGHT: 66 IN

## 2022-11-16 DIAGNOSIS — K21.9 GASTROESOPHAGEAL REFLUX DISEASE WITHOUT ESOPHAGITIS: ICD-10-CM

## 2022-11-16 DIAGNOSIS — Z23 NEED FOR VACCINATION: ICD-10-CM

## 2022-11-16 DIAGNOSIS — Z3A.01 LESS THAN 8 WEEKS GESTATION OF PREGNANCY: ICD-10-CM

## 2022-11-16 DIAGNOSIS — R11.0 NAUSEA: ICD-10-CM

## 2022-11-16 PROCEDURE — 90746 HEPB VACCINE 3 DOSE ADULT IM: CPT | Performed by: FAMILY MEDICINE

## 2022-11-16 PROCEDURE — 90471 IMMUNIZATION ADMIN: CPT | Performed by: FAMILY MEDICINE

## 2022-11-16 PROCEDURE — 99213 OFFICE O/P EST LOW 20 MIN: CPT | Mod: 25 | Performed by: FAMILY MEDICINE

## 2022-11-16 ASSESSMENT — FIBROSIS 4 INDEX: FIB4 SCORE: 0.62

## 2022-11-16 NOTE — PROGRESS NOTES
"Subjective:     CC: \"review labs and HBV caccine\"    HPI:   Flaquita presents today with:    Pregnant estimates at 7 weeks and 2 days based on LMP  [facility]  Takes PPI PRN when real bad, maybe once a week  Is taking a prenatal  Patient and  are cautiously optimistic due to history of miscarriage  She does not have much family left but her in laws are here and very supportive  Reviewed recent labs, Lipid panel a little elevated otherwise labs appears WNL    Problem   Less Than 8 Weeks Gestation of Pregnancy   Gastroesophageal Reflux Disease Without Esophagitis       Current Outpatient Medications Ordered in Epic   Medication Sig Dispense Refill    pantoprazole (PROTONIX) 20 MG tablet TAKE 1 TABLET BY MOUTH EVERY DAY 30 Tablet 1     No current Epic-ordered facility-administered medications on file.       Health Maintenance: Recombinant HBV vaccine dose 2 of 3    ROS:  ROS see HPI    Objective:     Exam:  /82   Pulse 61   Temp 36.8 °C (98.3 °F) (Temporal)   Ht 1.676 m (5' 6\")   Wt 114 kg (252 lb)   SpO2 98%   BMI 40.67 kg/m²  Body mass index is 40.67 kg/m².    Physical Exam  Vitals reviewed.   Constitutional:       General: She is not in acute distress.     Appearance: Normal appearance.   HENT:      Head: Normocephalic and atraumatic.   Cardiovascular:      Rate and Rhythm: Normal rate and regular rhythm.      Heart sounds: Normal heart sounds.   Pulmonary:      Effort: Pulmonary effort is normal.      Breath sounds: Normal breath sounds.   Skin:     General: Skin is warm and dry.   Neurological:      Mental Status: She is alert. Mental status is at baseline.      Gait: Gait normal.   Psychiatric:         Mood and Affect: Mood normal.         Behavior: Behavior normal.         Assessment & Plan:     32 y.o. female with the following -     Problem List Items Addressed This Visit       Gastroesophageal reflux disease without esophagitis     Was not able to get H. " Pylori test done, would most likely not treat while pregnant given extensive and not pregnant appropriate antibiotics that are used. Will use diet modification, TUMS and if needed PPI.          Less than 8 weeks gestation of pregnancy     Patient has planned follow up with her OB  Advised to continue with prenatal  TUMS as first line for heart burn  Discussed OTC nausea medicine          Other Visit Diagnoses       Nausea      Provided information in AVS regarding nausea and discussed Unisom and vitamin B6, mint teas though they can worsen heartburn    Need for vaccination                Return in about 5 months (around 4/16/2023), or if symptoms worsen or fail to improve.    Please note that this dictation was created using voice recognition software. I have made every reasonable attempt to correct obvious errors, but I expect that there are errors of grammar and possibly content that I did not discover before finalizing the note.

## 2022-11-16 NOTE — ASSESSMENT & PLAN NOTE
Was not able to get H. Pylori test done, would most likely not treat while pregnant given extensive and not pregnant appropriate antibiotics that are used. Will use diet modification, TUMS and if needed PPI.

## 2022-11-16 NOTE — ASSESSMENT & PLAN NOTE
Patient has planned follow up with her OB  Advised to continue with prenatal  TUMS as first line for heart burn  Discussed OTC nausea medicine

## 2022-11-28 DIAGNOSIS — K21.9 GASTROESOPHAGEAL REFLUX DISEASE WITHOUT ESOPHAGITIS: ICD-10-CM

## 2022-11-28 RX ORDER — PANTOPRAZOLE SODIUM 20 MG/1
20 TABLET, DELAYED RELEASE ORAL DAILY
Qty: 90 TABLET | Refills: 0 | Status: SHIPPED | OUTPATIENT
Start: 2022-11-28 | End: 2023-03-07

## 2023-03-07 DIAGNOSIS — K21.9 GASTROESOPHAGEAL REFLUX DISEASE WITHOUT ESOPHAGITIS: ICD-10-CM

## 2023-03-07 RX ORDER — PANTOPRAZOLE SODIUM 20 MG/1
20 TABLET, DELAYED RELEASE ORAL DAILY
Qty: 90 TABLET | Refills: 0 | Status: SHIPPED | OUTPATIENT
Start: 2023-03-07 | End: 2023-08-23

## 2023-06-27 ENCOUNTER — PHARMACY VISIT (OUTPATIENT)
Dept: PHARMACY | Facility: MEDICAL CENTER | Age: 33
End: 2023-06-27
Payer: COMMERCIAL

## 2023-06-27 PROCEDURE — RXMED WILLOW AMBULATORY MEDICATION CHARGE: Performed by: OBSTETRICS & GYNECOLOGY

## 2023-06-27 RX ORDER — IBUPROFEN 800 MG/1
TABLET ORAL
Qty: 42 TABLET | Refills: 0 | OUTPATIENT
Start: 2023-06-27

## 2023-06-27 RX ORDER — OXYCODONE HYDROCHLORIDE 5 MG/1
TABLET ORAL
Qty: 12 TABLET | Refills: 0 | OUTPATIENT
Start: 2023-06-27

## 2023-08-23 DIAGNOSIS — K21.9 GASTROESOPHAGEAL REFLUX DISEASE WITHOUT ESOPHAGITIS: ICD-10-CM

## 2023-08-23 RX ORDER — PANTOPRAZOLE SODIUM 20 MG/1
20 TABLET, DELAYED RELEASE ORAL DAILY
Qty: 90 TABLET | Refills: 0 | Status: SHIPPED | OUTPATIENT
Start: 2023-08-23

## 2025-07-23 ENCOUNTER — HOSPITAL ENCOUNTER (OUTPATIENT)
Dept: LAB | Facility: MEDICAL CENTER | Age: 35
End: 2025-07-23
Attending: NURSE PRACTITIONER
Payer: COMMERCIAL

## 2025-07-23 LAB
25(OH)D3 SERPL-MCNC: 23 NG/ML (ref 30–100)
ALBUMIN SERPL BCP-MCNC: 4 G/DL (ref 3.2–4.9)
ALBUMIN/GLOB SERPL: 1.2 G/DL
ALP SERPL-CCNC: 100 U/L (ref 30–99)
ALT SERPL-CCNC: 18 U/L (ref 2–50)
ANION GAP SERPL CALC-SCNC: 12 MMOL/L (ref 7–16)
AST SERPL-CCNC: 23 U/L (ref 12–45)
BASOPHILS # BLD AUTO: 0.6 % (ref 0–1.8)
BASOPHILS # BLD: 0.06 K/UL (ref 0–0.12)
BILIRUB SERPL-MCNC: 0.7 MG/DL (ref 0.1–1.5)
BUN SERPL-MCNC: 10 MG/DL (ref 8–22)
CALCIUM ALBUM COR SERPL-MCNC: 8.8 MG/DL (ref 8.5–10.5)
CALCIUM SERPL-MCNC: 8.8 MG/DL (ref 8.5–10.5)
CHLORIDE SERPL-SCNC: 105 MMOL/L (ref 96–112)
CHOLEST SERPL-MCNC: 176 MG/DL (ref 100–199)
CO2 SERPL-SCNC: 20 MMOL/L (ref 20–33)
CREAT SERPL-MCNC: 0.65 MG/DL (ref 0.5–1.4)
EOSINOPHIL # BLD AUTO: 0.11 K/UL (ref 0–0.51)
EOSINOPHIL NFR BLD: 1.2 % (ref 0–6.9)
ERYTHROCYTE [DISTWIDTH] IN BLOOD BY AUTOMATED COUNT: 43.3 FL (ref 35.9–50)
EST. AVERAGE GLUCOSE BLD GHB EST-MCNC: 111 MG/DL
FASTING STATUS PATIENT QL REPORTED: NORMAL
FERRITIN SERPL-MCNC: 13.8 NG/ML (ref 10–291)
GFR SERPLBLD CREATININE-BSD FMLA CKD-EPI: 116 ML/MIN/1.73 M 2
GLOBULIN SER CALC-MCNC: 3.4 G/DL (ref 1.9–3.5)
GLUCOSE SERPL-MCNC: 76 MG/DL (ref 65–99)
HBA1C MFR BLD: 5.5 % (ref 4–5.6)
HCT VFR BLD AUTO: 38.8 % (ref 37–47)
HDLC SERPL-MCNC: 32 MG/DL
HGB BLD-MCNC: 12 G/DL (ref 12–16)
IMM GRANULOCYTES # BLD AUTO: 0.02 K/UL (ref 0–0.11)
IMM GRANULOCYTES NFR BLD AUTO: 0.2 % (ref 0–0.9)
IRON SATN MFR SERPL: 10 % (ref 15–55)
IRON SERPL-MCNC: 39 UG/DL (ref 40–170)
LDLC SERPL CALC-MCNC: 97 MG/DL
LYMPHOCYTES # BLD AUTO: 3.89 K/UL (ref 1–4.8)
LYMPHOCYTES NFR BLD: 41.1 % (ref 22–41)
MCH RBC QN AUTO: 24.6 PG (ref 27–33)
MCHC RBC AUTO-ENTMCNC: 30.9 G/DL (ref 32.2–35.5)
MCV RBC AUTO: 79.5 FL (ref 81.4–97.8)
MONOCYTES # BLD AUTO: 0.83 K/UL (ref 0–0.85)
MONOCYTES NFR BLD AUTO: 8.8 % (ref 0–13.4)
NEUTROPHILS # BLD AUTO: 4.55 K/UL (ref 1.82–7.42)
NEUTROPHILS NFR BLD: 48.1 % (ref 44–72)
NRBC # BLD AUTO: 0 K/UL
NRBC BLD-RTO: 0 /100 WBC (ref 0–0.2)
PLATELET # BLD AUTO: 302 K/UL (ref 164–446)
PMV BLD AUTO: 11.5 FL (ref 9–12.9)
POTASSIUM SERPL-SCNC: 4.3 MMOL/L (ref 3.6–5.5)
PROT SERPL-MCNC: 7.4 G/DL (ref 6–8.2)
RBC # BLD AUTO: 4.88 M/UL (ref 4.2–5.4)
SODIUM SERPL-SCNC: 137 MMOL/L (ref 135–145)
TIBC SERPL-MCNC: 378 UG/DL (ref 250–450)
TRIGL SERPL-MCNC: 235 MG/DL (ref 0–149)
TSH SERPL-ACNC: 1.81 UIU/ML (ref 0.38–5.33)
UIBC SERPL-MCNC: 339 UG/DL (ref 110–370)
WBC # BLD AUTO: 9.5 K/UL (ref 4.8–10.8)

## 2025-07-23 PROCEDURE — 82306 VITAMIN D 25 HYDROXY: CPT

## 2025-07-23 PROCEDURE — 83036 HEMOGLOBIN GLYCOSYLATED A1C: CPT

## 2025-07-23 PROCEDURE — 82570 ASSAY OF URINE CREATININE: CPT

## 2025-07-23 PROCEDURE — 85025 COMPLETE CBC W/AUTO DIFF WBC: CPT

## 2025-07-23 PROCEDURE — 80061 LIPID PANEL: CPT

## 2025-07-23 PROCEDURE — 82043 UR ALBUMIN QUANTITATIVE: CPT

## 2025-07-23 PROCEDURE — 82728 ASSAY OF FERRITIN: CPT

## 2025-07-23 PROCEDURE — 80053 COMPREHEN METABOLIC PANEL: CPT

## 2025-07-23 PROCEDURE — 83550 IRON BINDING TEST: CPT

## 2025-07-23 PROCEDURE — 83540 ASSAY OF IRON: CPT

## 2025-07-23 PROCEDURE — 84443 ASSAY THYROID STIM HORMONE: CPT

## 2025-07-23 PROCEDURE — 36415 COLL VENOUS BLD VENIPUNCTURE: CPT

## 2025-07-24 LAB
CREAT UR-MCNC: 207 MG/DL
MICROALBUMIN UR-MCNC: 1.8 MG/DL
MICROALBUMIN/CREAT UR: 9 MG/G (ref 0–30)

## (undated) DEVICE — TUBE CONNECT SUCTION CLEAR 120 X 1/4" (50EA/CA)"

## (undated) DEVICE — CANISTER SUCTION 3000ML MECHANICAL FILTER AUTO SHUTOFF MEDI-VAC NONSTERILE LF DISP  (40EA/CA)

## (undated) DEVICE — TUBE CONNECTING SUCTION - CLEAR PLASTIC STERILE 72 IN (50EA/CA)

## (undated) DEVICE — TRAY SRGPRP PVP IOD WT PRP - (20/CA)

## (undated) DEVICE — TUBING CLEARLINK DUO-VENT - C-FLO (48EA/CA)

## (undated) DEVICE — HEAD HOLDER JUNIOR/ADULT

## (undated) DEVICE — GLOVE BIOGEL SZ 6 PF LATEX - (50EA/BX 4BX/CA)

## (undated) DEVICE — BAG RETRIEVAL 10ML (10EA/BX)

## (undated) DEVICE — SET SUCTION/IRRIGATION WITH DISPOSABLE TIP (6/CA )PART #0250-070-520 IS A SUB

## (undated) DEVICE — TROCAR Z THREAD11MM OPTICAL - NON BLADED(6/BX)

## (undated) DEVICE — PAD SANITARY 11IN MAXI IND WRAPPED  (12EA/PK 24PK/CA)

## (undated) DEVICE — APPLICATOR EVICEL TIP   35CM - (3/BX)

## (undated) DEVICE — CATHETER IV 20 GA X 1-1/4 ---SURG.& SDS ONLY--- (50EA/BX)

## (undated) DEVICE — TROCAR 5X100 BLADED Z-THREAD - KII (6/BX)

## (undated) DEVICE — WATER IRRIGATION STERILE 1000ML (12EA/CA)

## (undated) DEVICE — TROCARCANN&SEAL 5X55 ZTHREAD - 12/BX

## (undated) DEVICE — CANNULA W/SEAL 5X100 Z-THRE - ADED KII (12/BX)

## (undated) DEVICE — ARMREST CRADLE FOAM - (2PR/PK 12PR/CA)

## (undated) DEVICE — SUTURE GENERAL

## (undated) DEVICE — LIGASURE LAPAROSCOPIC 5MM - (6EA/CA)

## (undated) DEVICE — TUBING SETDISPOS HIGH FLOW II - (10/BX)

## (undated) DEVICE — DERMABOND ADVANCED - (12EA/BX)

## (undated) DEVICE — KIT ANESTHESIA W/CIRCUIT & 3/LT BAG W/FILTER (20EA/CA)

## (undated) DEVICE — NEPTUNE 4 PORT MANIFOLD - (20/PK)

## (undated) DEVICE — PROTECTOR ULNA NERVE - (36PR/CA)

## (undated) DEVICE — ELECTRODE 850 FOAM ADHESIVE - HYDROGEL RADIOTRNSPRNT (50/PK)

## (undated) DEVICE — CANISTER SUCTION RIGID RED 1500CC (40EA/CA)

## (undated) DEVICE — SUCTION INSTRUMENT YANKAUER BULBOUS TIP W/O VENT (50EA/CA)

## (undated) DEVICE — SUTURE 0 VICRYL PLUS UR-6 - 27 INCH (36/BX)

## (undated) DEVICE — SUTURE 4-0 VICRYL PLUS FS-2 - 27 INCH (36/BX)

## (undated) DEVICE — KIT  I.V. START (100EA/CA)

## (undated) DEVICE — SET LEADWIRE 5 LEAD BEDSIDE DISPOSABLE ECG (1SET OF 5/EA)

## (undated) DEVICE — TRAY FOLEY CATHETER STATLOCK 16FR SURESTEP  (10EA/CA)

## (undated) DEVICE — GOWN SURGEONS X-LARGE - DISP. (30/CA)

## (undated) DEVICE — TROCAR5X55 KII SHIELDED SYS - (6/BX)

## (undated) DEVICE — EVICEL 5ML - (1/BX)REFRIGERATE UPON RECEIPT

## (undated) DEVICE — PACK LAPAROSCOPY - (1/CA)

## (undated) DEVICE — SENSOR SPO2 NEO LNCS ADHESIVE (20/BX) SEE USER NOTES

## (undated) DEVICE — CHLORAPREP 26 ML APPLICATOR - ORANGE TINT(25/CA)

## (undated) DEVICE — MASK ANESTHESIA ADULT  - (100/CA)

## (undated) DEVICE — TIP APPLICATOR EVICEL SPRAYER (3/BX)

## (undated) DEVICE — LACTATED RINGERS INJ 1000 ML - (14EA/CA 60CA/PF)

## (undated) DEVICE — SPACEMAKER BLUNT TIP TROCAR - 10MM (5/BX) KIT COMPONENT----USE ITEM 14468---

## (undated) DEVICE — SODIUM CHL IRRIGATION 0.9% 1000ML (12EA/CA)

## (undated) DEVICE — GOWN WARMING STANDARD FLEX - (30/CA)